# Patient Record
Sex: MALE | Race: WHITE | NOT HISPANIC OR LATINO | Employment: OTHER | URBAN - METROPOLITAN AREA
[De-identification: names, ages, dates, MRNs, and addresses within clinical notes are randomized per-mention and may not be internally consistent; named-entity substitution may affect disease eponyms.]

---

## 2023-02-23 LAB
ALANINE AMINOTRANSFERASE (SGPT) (U/L) IN SER/PLAS: 15 U/L (ref 10–52)
ALBUMIN (G/DL) IN SER/PLAS: 4.3 G/DL (ref 3.4–5)
ALKALINE PHOSPHATASE (U/L) IN SER/PLAS: 53 U/L (ref 33–136)
ANION GAP IN SER/PLAS: 10 MMOL/L (ref 10–20)
ASPARTATE AMINOTRANSFERASE (SGOT) (U/L) IN SER/PLAS: 16 U/L (ref 9–39)
BASOPHILS (10*3/UL) IN BLOOD BY AUTOMATED COUNT: 0.02 X10E9/L (ref 0–0.1)
BASOPHILS/100 LEUKOCYTES IN BLOOD BY AUTOMATED COUNT: 0.3 % (ref 0–2)
BILIRUBIN TOTAL (MG/DL) IN SER/PLAS: 0.8 MG/DL (ref 0–1.2)
CALCIUM (MG/DL) IN SER/PLAS: 8.9 MG/DL (ref 8.6–10.3)
CARBON DIOXIDE, TOTAL (MMOL/L) IN SER/PLAS: 31 MMOL/L (ref 21–32)
CHLORIDE (MMOL/L) IN SER/PLAS: 103 MMOL/L (ref 98–107)
CREATININE (MG/DL) IN SER/PLAS: 0.93 MG/DL (ref 0.5–1.3)
EOSINOPHILS (10*3/UL) IN BLOOD BY AUTOMATED COUNT: 0.16 X10E9/L (ref 0–0.4)
EOSINOPHILS/100 LEUKOCYTES IN BLOOD BY AUTOMATED COUNT: 2.7 % (ref 0–6)
ERYTHROCYTE DISTRIBUTION WIDTH (RATIO) BY AUTOMATED COUNT: 11.2 % (ref 11.5–14.5)
ERYTHROCYTE MEAN CORPUSCULAR HEMOGLOBIN CONCENTRATION (G/DL) BY AUTOMATED: 33.9 G/DL (ref 32–36)
ERYTHROCYTE MEAN CORPUSCULAR VOLUME (FL) BY AUTOMATED COUNT: 98 FL (ref 80–100)
ERYTHROCYTES (10*6/UL) IN BLOOD BY AUTOMATED COUNT: 4.05 X10E12/L (ref 4.5–5.9)
GFR MALE: 85 ML/MIN/1.73M2
GLUCOSE (MG/DL) IN SER/PLAS: 86 MG/DL (ref 74–99)
HEMATOCRIT (%) IN BLOOD BY AUTOMATED COUNT: 39.5 % (ref 41–52)
HEMOGLOBIN (G/DL) IN BLOOD: 13.4 G/DL (ref 13.5–17.5)
IMMATURE GRANULOCYTES/100 LEUKOCYTES IN BLOOD BY AUTOMATED COUNT: 0.2 % (ref 0–0.9)
LEUKOCYTES (10*3/UL) IN BLOOD BY AUTOMATED COUNT: 5.9 X10E9/L (ref 4.4–11.3)
LYMPHOCYTES (10*3/UL) IN BLOOD BY AUTOMATED COUNT: 1.88 X10E9/L (ref 0.8–3)
LYMPHOCYTES/100 LEUKOCYTES IN BLOOD BY AUTOMATED COUNT: 32 % (ref 13–44)
MONOCYTES (10*3/UL) IN BLOOD BY AUTOMATED COUNT: 0.58 X10E9/L (ref 0.05–0.8)
MONOCYTES/100 LEUKOCYTES IN BLOOD BY AUTOMATED COUNT: 9.9 % (ref 2–10)
NEUTROPHILS (10*3/UL) IN BLOOD BY AUTOMATED COUNT: 3.23 X10E9/L (ref 1.6–5.5)
NEUTROPHILS/100 LEUKOCYTES IN BLOOD BY AUTOMATED COUNT: 54.9 % (ref 40–80)
PLATELETS (10*3/UL) IN BLOOD AUTOMATED COUNT: 167 X10E9/L (ref 150–450)
POTASSIUM (MMOL/L) IN SER/PLAS: 4.4 MMOL/L (ref 3.5–5.3)
PROTEIN TOTAL: 6.2 G/DL (ref 6.4–8.2)
SODIUM (MMOL/L) IN SER/PLAS: 140 MMOL/L (ref 136–145)
UREA NITROGEN (MG/DL) IN SER/PLAS: 17 MG/DL (ref 6–23)

## 2023-02-25 LAB — STAPH/MRSA SCREEN, CULTURE: NORMAL

## 2023-06-26 ENCOUNTER — OFFICE VISIT (OUTPATIENT)
Dept: PRIMARY CARE | Facility: CLINIC | Age: 78
End: 2023-06-26
Payer: MEDICARE

## 2023-06-26 VITALS
HEART RATE: 75 BPM | HEIGHT: 75 IN | BODY MASS INDEX: 21.88 KG/M2 | DIASTOLIC BLOOD PRESSURE: 70 MMHG | RESPIRATION RATE: 16 BRPM | SYSTOLIC BLOOD PRESSURE: 122 MMHG | WEIGHT: 176 LBS | OXYGEN SATURATION: 97 %

## 2023-06-26 DIAGNOSIS — F51.01 PRIMARY INSOMNIA: ICD-10-CM

## 2023-06-26 DIAGNOSIS — F32.9 MAJOR DEPRESSIVE DISORDER WITH SINGLE EPISODE, REMISSION STATUS UNSPECIFIED: Primary | ICD-10-CM

## 2023-06-26 DIAGNOSIS — I10 PRIMARY HYPERTENSION: ICD-10-CM

## 2023-06-26 PROBLEM — E78.5 DYSLIPIDEMIA: Status: ACTIVE | Noted: 2023-06-26

## 2023-06-26 PROBLEM — F32.A DEPRESSION: Status: ACTIVE | Noted: 2023-06-26

## 2023-06-26 PROBLEM — G47.00 INSOMNIA: Status: ACTIVE | Noted: 2023-06-26

## 2023-06-26 PROBLEM — I25.10 CORONARY ARTERY DISEASE INVOLVING NATIVE CORONARY ARTERY OF NATIVE HEART WITHOUT ANGINA PECTORIS: Status: ACTIVE | Noted: 2023-06-26

## 2023-06-26 PROBLEM — H90.3 ASYMMETRIC SNHL (SENSORINEURAL HEARING LOSS): Status: ACTIVE | Noted: 2023-06-26

## 2023-06-26 PROBLEM — J45.20 MILD INTERMITTENT ASTHMA WITHOUT COMPLICATION (HHS-HCC): Status: ACTIVE | Noted: 2023-06-26

## 2023-06-26 PROBLEM — I25.2 H/O NON-ST ELEVATION MYOCARDIAL INFARCTION (NSTEMI): Status: ACTIVE | Noted: 2023-06-26

## 2023-06-26 PROCEDURE — 99213 OFFICE O/P EST LOW 20 MIN: CPT | Performed by: FAMILY MEDICINE

## 2023-06-26 PROCEDURE — 3078F DIAST BP <80 MM HG: CPT | Performed by: FAMILY MEDICINE

## 2023-06-26 PROCEDURE — 1160F RVW MEDS BY RX/DR IN RCRD: CPT | Performed by: FAMILY MEDICINE

## 2023-06-26 PROCEDURE — 1036F TOBACCO NON-USER: CPT | Performed by: FAMILY MEDICINE

## 2023-06-26 PROCEDURE — 3074F SYST BP LT 130 MM HG: CPT | Performed by: FAMILY MEDICINE

## 2023-06-26 PROCEDURE — 1159F MED LIST DOCD IN RCRD: CPT | Performed by: FAMILY MEDICINE

## 2023-06-26 RX ORDER — CLOPIDOGREL BISULFATE 75 MG/1
75 TABLET ORAL DAILY
COMMUNITY
End: 2023-10-18

## 2023-06-26 RX ORDER — NITROGLYCERIN 0.4 MG/1
0.4 TABLET SUBLINGUAL EVERY 5 MIN PRN
COMMUNITY

## 2023-06-26 RX ORDER — TRAZODONE HYDROCHLORIDE 50 MG/1
50 TABLET ORAL NIGHTLY
Qty: 90 TABLET | Refills: 1 | Status: SHIPPED | OUTPATIENT
Start: 2023-06-26 | End: 2023-12-30

## 2023-06-26 RX ORDER — ESCITALOPRAM OXALATE 20 MG/1
20 TABLET ORAL DAILY
Qty: 90 TABLET | Refills: 1 | Status: SHIPPED | OUTPATIENT
Start: 2023-06-26 | End: 2024-01-24

## 2023-06-26 RX ORDER — ESCITALOPRAM OXALATE 20 MG/1
20 TABLET ORAL DAILY
COMMUNITY
End: 2023-06-26 | Stop reason: SDUPTHER

## 2023-06-26 RX ORDER — LISINOPRIL 5 MG/1
5 TABLET ORAL DAILY
COMMUNITY
End: 2023-10-18

## 2023-06-26 RX ORDER — METOPROLOL SUCCINATE 50 MG/1
50 TABLET, EXTENDED RELEASE ORAL DAILY
COMMUNITY
Start: 2023-06-21 | End: 2023-10-18

## 2023-06-26 RX ORDER — TRAZODONE HYDROCHLORIDE 50 MG/1
50 TABLET ORAL NIGHTLY
COMMUNITY
End: 2023-06-26 | Stop reason: SDUPTHER

## 2023-06-26 RX ORDER — ROSUVASTATIN CALCIUM 40 MG/1
40 TABLET, COATED ORAL DAILY
COMMUNITY
End: 2023-10-18

## 2023-06-26 ASSESSMENT — ENCOUNTER SYMPTOMS
HEADACHES: 0
PALPITATIONS: 0
SHORTNESS OF BREATH: 0
DEPRESSION: 1
HYPERTENSION: 1

## 2023-06-26 ASSESSMENT — PAIN SCALES - GENERAL: PAINLEVEL: 0-NO PAIN

## 2023-06-26 ASSESSMENT — PATIENT HEALTH QUESTIONNAIRE - PHQ9
1. LITTLE INTEREST OR PLEASURE IN DOING THINGS: NOT AT ALL
2. FEELING DOWN, DEPRESSED OR HOPELESS: NOT AT ALL
SUM OF ALL RESPONSES TO PHQ9 QUESTIONS 1 AND 2: 0

## 2023-06-26 NOTE — PATIENT INSTRUCTIONS
For BP and heart, taking  clopidogrel 75 mg, lisinopril 5 mg,  metoprolol ER 50 mg,  and  nitroglycerin  as needed.  Diet should be  low cholesterol,  high fiber, and low in sodium (2000 mg/day or less)  BP controlled.  Seeing cardiology every 6  months.    Taking rosuvastatin 40 mg for cholesterol.  Check fasting cholesterol next visit.    Depression and sleep are stable on escitalopram 20 mg and trazodone  50 mg.  Follow up  6 months, can make it a Medicare Wellness visit (due Jan 13)

## 2023-06-26 NOTE — PROGRESS NOTES
"Subjective   Patient ID: Nico Spain is a 78 y.o. male who presents for Hypertension and Depression.  When a kid had a kidney infection.   Concerned about whether he has CKD.  Labs  done show normal kidney function     Feeling well.  Not checking BP  No swelling.  Seeing cardiologist for CAD,  wonders about a heart healthy diet.    Depression and sleep doing well with current medication.  No side effects.  No SI      Hypertension  This is a chronic problem. The problem is controlled. Pertinent negatives include no chest pain, headaches, palpitations, peripheral edema or shortness of breath.   DepressionPatient is not experiencing: palpitations and shortness of breath.          Review of Systems   Respiratory:  Negative for shortness of breath.    Cardiovascular:  Negative for chest pain and palpitations.   Neurological:  Negative for headaches.   Psychiatric/Behavioral:  Positive for depression.        Objective   /70 (BP Location: Left arm, Patient Position: Sitting, BP Cuff Size: Adult)   Pulse 75   Resp 16   Ht 1.905 m (6' 3\")   Wt 79.8 kg (176 lb)   SpO2 97%   BMI 22.00 kg/m²     Physical Exam  Vitals reviewed.   Constitutional:       Appearance: Normal appearance.   Cardiovascular:      Rate and Rhythm: Normal rate and regular rhythm.      Heart sounds: No murmur heard.  Pulmonary:      Effort: Pulmonary effort is normal.      Breath sounds: Normal breath sounds.   Musculoskeletal:      Right lower leg: No edema.      Left lower leg: No edema.   Neurological:      Mental Status: He is alert.           Assessment/Plan   Problem List Items Addressed This Visit       Primary hypertension    Depression - Primary    Relevant Medications    escitalopram (Lexapro) 20 mg tablet    Insomnia    Relevant Medications    traZODone (Desyrel) 50 mg tablet          "

## 2023-06-28 LAB
CHOLESTEROL (MG/DL) IN SER/PLAS: 172 MG/DL (ref 0–199)
CHOLESTEROL IN HDL (MG/DL) IN SER/PLAS: 54.4 MG/DL
CHOLESTEROL/HDL RATIO: 3.2
LDL: 99 MG/DL (ref 0–99)
TRIGLYCERIDE (MG/DL) IN SER/PLAS: 93 MG/DL (ref 0–149)
VLDL: 19 MG/DL (ref 0–40)

## 2023-07-01 LAB — LIPOPROTEIN A: 92 MG/DL

## 2023-07-27 LAB
CHOLESTEROL (MG/DL) IN SER/PLAS: 143 MG/DL (ref 0–199)
CHOLESTEROL IN HDL (MG/DL) IN SER/PLAS: 57 MG/DL
CHOLESTEROL/HDL RATIO: 2.5
LDL: 73 MG/DL (ref 0–99)
TRIGLYCERIDE (MG/DL) IN SER/PLAS: 64 MG/DL (ref 0–149)
VLDL: 13 MG/DL (ref 0–40)

## 2023-07-31 LAB — LIPOPROTEIN A: 77 MG/DL

## 2023-10-18 ENCOUNTER — OFFICE VISIT (OUTPATIENT)
Dept: PRIMARY CARE | Facility: CLINIC | Age: 78
End: 2023-10-18
Payer: MEDICARE

## 2023-10-18 VITALS
WEIGHT: 174.6 LBS | OXYGEN SATURATION: 96 % | TEMPERATURE: 97.8 F | DIASTOLIC BLOOD PRESSURE: 64 MMHG | BODY MASS INDEX: 21.71 KG/M2 | SYSTOLIC BLOOD PRESSURE: 140 MMHG | HEART RATE: 74 BPM | HEIGHT: 75 IN

## 2023-10-18 DIAGNOSIS — J06.9 VIRAL URI WITH COUGH: Primary | ICD-10-CM

## 2023-10-18 PROCEDURE — 1036F TOBACCO NON-USER: CPT | Performed by: FAMILY MEDICINE

## 2023-10-18 PROCEDURE — 3077F SYST BP >= 140 MM HG: CPT | Performed by: FAMILY MEDICINE

## 2023-10-18 PROCEDURE — 1126F AMNT PAIN NOTED NONE PRSNT: CPT | Performed by: FAMILY MEDICINE

## 2023-10-18 PROCEDURE — 3078F DIAST BP <80 MM HG: CPT | Performed by: FAMILY MEDICINE

## 2023-10-18 PROCEDURE — 1160F RVW MEDS BY RX/DR IN RCRD: CPT | Performed by: FAMILY MEDICINE

## 2023-10-18 PROCEDURE — 99213 OFFICE O/P EST LOW 20 MIN: CPT | Performed by: FAMILY MEDICINE

## 2023-10-18 PROCEDURE — 1159F MED LIST DOCD IN RCRD: CPT | Performed by: FAMILY MEDICINE

## 2023-10-18 ASSESSMENT — PAIN SCALES - GENERAL: PAINLEVEL: 0-NO PAIN

## 2023-10-18 ASSESSMENT — ENCOUNTER SYMPTOMS
RHINORRHEA: 1
CHILLS: 0
SWEATS: 0
MYALGIAS: 0
WEIGHT LOSS: 0
FEVER: 0
SORE THROAT: 1
HEMOPTYSIS: 0
HEARTBURN: 0
SHORTNESS OF BREATH: 0
HEADACHES: 0
WHEEZING: 1
COUGH: 1

## 2023-10-18 NOTE — ASSESSMENT & PLAN NOTE
Recommend symptomatic treatment including increased hydration, regular nasal saline, use of analgesics such as acetaminophen/ibuprofen   Follow up if symptoms fail to improve

## 2023-10-18 NOTE — PROGRESS NOTES
"Subjective   Patient ID: Nico Spain is a 78 y.o. male who presents for Cough (Wet cough 3 days/) and Nasal Congestion.    Cough  This is a new problem. The current episode started yesterday. The problem has been unchanged. The problem occurs every few hours. The cough is Productive of sputum and productive of purulent sputum. Associated symptoms include ear congestion, nasal congestion, postnasal drip, rhinorrhea, a sore throat and wheezing. Pertinent negatives include no chest pain, chills, ear pain, fever, headaches, heartburn, hemoptysis, myalgias, rash, shortness of breath, sweats or weight loss. Risk factors for lung disease include animal exposure and smoking/tobacco exposure.        Review of Systems   Constitutional:  Negative for chills, fever and weight loss.   HENT:  Positive for postnasal drip, rhinorrhea and sore throat. Negative for ear pain.    Respiratory:  Positive for cough and wheezing. Negative for hemoptysis and shortness of breath.    Cardiovascular:  Negative for chest pain.   Gastrointestinal:  Negative for heartburn.   Musculoskeletal:  Negative for myalgias.   Skin:  Negative for rash.   Neurological:  Negative for headaches.       Objective   /64 (BP Location: Right arm, Patient Position: Sitting, BP Cuff Size: Small adult)   Pulse 74   Temp 36.6 °C (97.8 °F) (Oral)   Ht 1.905 m (6' 3\")   Wt 79.2 kg (174 lb 9.6 oz)   SpO2 96%   BMI 21.82 kg/m²     Physical Exam  HENT:      Head: Normocephalic and atraumatic.      Right Ear: Tympanic membrane and ear canal normal.      Left Ear: Tympanic membrane and ear canal normal.      Nose: Nose normal.      Mouth/Throat:      Mouth: Mucous membranes are moist.      Pharynx: Oropharynx is clear.   Eyes:      Conjunctiva/sclera: Conjunctivae normal.   Cardiovascular:      Rate and Rhythm: Normal rate and regular rhythm.   Pulmonary:      Effort: Pulmonary effort is normal.      Breath sounds: Normal breath sounds.   Skin:     General: Skin " is warm.   Neurological:      Mental Status: He is alert.         Assessment/Plan   Problem List Items Addressed This Visit             ICD-10-CM    Viral URI with cough - Primary J06.9     Recommend symptomatic treatment including increased hydration, regular nasal saline, use of analgesics such as acetaminophen/ibuprofen   Follow up if symptoms fail to improve

## 2023-10-19 PROBLEM — I20.0 UNSTABLE ANGINA PECTORIS (MULTI): Status: ACTIVE | Noted: 2023-10-19

## 2023-10-19 PROBLEM — L29.9 PRURITUS: Status: ACTIVE | Noted: 2023-10-19

## 2023-10-19 PROBLEM — M25.472 LEFT ANKLE SWELLING: Status: ACTIVE | Noted: 2023-10-19

## 2023-10-19 PROBLEM — H35.371 EPIRETINAL MEMBRANE (ERM) OF RIGHT EYE: Status: ACTIVE | Noted: 2018-05-07

## 2023-10-19 PROBLEM — K40.90 INGUINAL HERNIA, LEFT: Status: ACTIVE | Noted: 2023-10-19

## 2023-10-19 PROBLEM — S80.02XA CONTUSION OF LEFT KNEE: Status: ACTIVE | Noted: 2023-10-19

## 2023-10-19 PROBLEM — G89.29 CHRONIC BILATERAL LOW BACK PAIN WITHOUT SCIATICA: Status: ACTIVE | Noted: 2023-10-19

## 2023-10-19 PROBLEM — M54.50 CHRONIC BILATERAL LOW BACK PAIN WITHOUT SCIATICA: Status: ACTIVE | Noted: 2023-10-19

## 2023-10-19 PROBLEM — I21.9 MYOCARDIAL INFARCTION (MULTI): Status: ACTIVE | Noted: 2023-10-19

## 2023-10-19 PROBLEM — S70.01XA CONTUSION OF RIGHT HIP: Status: ACTIVE | Noted: 2023-10-19

## 2023-10-19 PROBLEM — Z96.1 PSEUDOPHAKIA OF BOTH EYES: Status: ACTIVE | Noted: 2018-07-20

## 2023-10-20 ENCOUNTER — CLINICAL SUPPORT (OUTPATIENT)
Dept: AUDIOLOGY | Facility: CLINIC | Age: 78
End: 2023-10-20
Payer: MEDICARE

## 2023-10-20 DIAGNOSIS — H90.3 ASYMMETRIC SNHL (SENSORINEURAL HEARING LOSS): Primary | ICD-10-CM

## 2023-10-20 PROCEDURE — 92557 COMPREHENSIVE HEARING TEST: CPT | Performed by: AUDIOLOGIST

## 2023-10-20 PROCEDURE — 92567 TYMPANOMETRY: CPT | Performed by: AUDIOLOGIST

## 2023-10-20 NOTE — PROGRESS NOTES
"AUDIOLOGY ADULT AUDIOMETRIC EVALUATION      Name:  Nico Spain  :  1945  Age:  78 y.o.  Date of Evaluation:  10/20/2023     HISTORY  Nico Spain was seen today for a hearing evaluation due to known asymmetric sensorineural hearing loss, worse in the left ear.   He reports sudden hearing loss in the left ear which occurred in . He reports more frustration hearing in noisy environments and feeling removed from many conversations due to his hearing loss.    Denies hearing loss, tinnitus, vertigo, aural pain, drainage, fullness (some fullness after recent flight and cold), history of familial hearing loss or noise exposure.    EVALUATION  See scanned Audiogram in \"Media\".    RESULTS:    Otoscopic Evaluation:    Otoscopy revealed clear ear canals and visualized tympanic membrane, bilaterally.    Immittance:    Tympanometry:  RIGHT: Negative middle ear pressure, normal mobility, and ear canal volume.  LEFT: Negative middle ear pressure, normal mobility, and ear canal volume.    Stapedial Acoustic Reflexes Thresholds (ART)(Probe ear)  Could not be tested due to lack of probe tip seal.    Pure Tone and Speech Audiometry:    Test technique:  Pure Tone Audiometry via insert earphones  Test Reliability:   good    RIGHT: Normal hearing 125-3000 Hz sloping to a moderately severe sensorineural hearing loss at 8000 Hz. Word Recognition score was excellent using recorded material (NU-6 10-word list ordered by difficulty).   LEFT: Mild to profound sensorineural hearing loss 125-8000 Hz. Word Recognition score was poor using recorded material (NU-6 25-word list ordered by difficulty).     IMPRESSIONS:  Today's test results indicate stable hearing as compared to previous testing.  Asymmetric sensorineural hearing loss worse in the left ear. Discussed audiogram at length including speech sounds in which access is limited due to the hearing loss. Discussed implications of hearing loss as well as benefits of amplification. " Communication strategies were discussed (utilizing visual cues/gestures; reducing background noise and distance from desired source; increasing light to assist with visual cues; use of clear speech).     Consider BICROS amplification, briefly discussed BAImplantation which is he not interested at this time.    RECOMMENDATIONS:  Continue medical follow-up with physician.  Return for audiologic assessment in conjunction with otologic care or annually.   Amplification options briefly discussed and literature provided re: hearing aids. Consider amplification pending medical clearance. Check insurance benefit for hearing aid benefits and in-network providers. To schedule an appointment for a hearing aid consultation call [phone].     PATIENT EDUCATION:   Discussed results and recommendations with Nico Spain.  Questions were addressed and the patient was encouraged to contact our department ([phone]) should concerns arise.      ALMA Duran, CCC-A  Senior Clinical Audiologist    TIME: [id number]

## 2023-11-22 ENCOUNTER — OFFICE VISIT (OUTPATIENT)
Dept: PRIMARY CARE | Facility: CLINIC | Age: 78
End: 2023-11-22
Payer: MEDICARE

## 2023-11-22 VITALS
BODY MASS INDEX: 22.26 KG/M2 | TEMPERATURE: 97.8 F | DIASTOLIC BLOOD PRESSURE: 78 MMHG | OXYGEN SATURATION: 98 % | WEIGHT: 179 LBS | HEIGHT: 75 IN | HEART RATE: 78 BPM | SYSTOLIC BLOOD PRESSURE: 124 MMHG

## 2023-11-22 DIAGNOSIS — N52.9 ERECTILE DYSFUNCTION, UNSPECIFIED ERECTILE DYSFUNCTION TYPE: Primary | ICD-10-CM

## 2023-11-22 DIAGNOSIS — C95.91 LEUKEMIA IN REMISSION, UNSPECIFIED LEUKEMIA TYPE (MULTI): ICD-10-CM

## 2023-11-22 DIAGNOSIS — R19.6 HALITOSIS: ICD-10-CM

## 2023-11-22 DIAGNOSIS — H91.92 HEARING LOSS OF LEFT EAR, UNSPECIFIED HEARING LOSS TYPE: ICD-10-CM

## 2023-11-22 PROCEDURE — 1126F AMNT PAIN NOTED NONE PRSNT: CPT | Performed by: FAMILY MEDICINE

## 2023-11-22 PROCEDURE — 3074F SYST BP LT 130 MM HG: CPT | Performed by: FAMILY MEDICINE

## 2023-11-22 PROCEDURE — 99213 OFFICE O/P EST LOW 20 MIN: CPT | Performed by: FAMILY MEDICINE

## 2023-11-22 PROCEDURE — 1159F MED LIST DOCD IN RCRD: CPT | Performed by: FAMILY MEDICINE

## 2023-11-22 PROCEDURE — 1160F RVW MEDS BY RX/DR IN RCRD: CPT | Performed by: FAMILY MEDICINE

## 2023-11-22 PROCEDURE — 1036F TOBACCO NON-USER: CPT | Performed by: FAMILY MEDICINE

## 2023-11-22 PROCEDURE — 3078F DIAST BP <80 MM HG: CPT | Performed by: FAMILY MEDICINE

## 2023-11-22 RX ORDER — TADALAFIL 20 MG/1
20 TABLET ORAL DAILY PRN
Qty: 12 TABLET | Refills: 3 | Status: SHIPPED | OUTPATIENT
Start: 2023-11-22 | End: 2024-11-21

## 2023-11-22 ASSESSMENT — PAIN SCALES - GENERAL: PAINLEVEL: 0-NO PAIN

## 2023-11-22 ASSESSMENT — PATIENT HEALTH QUESTIONNAIRE - PHQ9
2. FEELING DOWN, DEPRESSED OR HOPELESS: NOT AT ALL
SUM OF ALL RESPONSES TO PHQ9 QUESTIONS 1 AND 2: 0
1. LITTLE INTEREST OR PLEASURE IN DOING THINGS: NOT AT ALL

## 2023-11-22 ASSESSMENT — ENCOUNTER SYMPTOMS
LOSS OF SENSATION IN FEET: 0
DEPRESSION: 0
OCCASIONAL FEELINGS OF UNSTEADINESS: 0

## 2023-11-22 NOTE — PATIENT INSTRUCTIONS
Left ear hearing loss since 1997.  Will be getting hearing aid.  No contraindications.    For erectile dysfunction, cialis sent to the pharmacy to use as needed.  Do not use if nitroglycerin is taken, since BP can drop too much.    For halitosis, can try oscar pot to help clear sinuses, and see if that works.

## 2023-11-22 NOTE — PROGRESS NOTES
"Subjective   Patient ID: Nico Spain is a 78 y.o. male who presents for Hearing Problem.  Left ear with hearing loss.  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  Not clear whether it was related or not.  Now decided he'd like to do something about it.  Has been to hearing center at [facility].  Had hearing tests, and advised hearing aid.  They said he may want to let PCP know, and make sure there were no problems with getting one.    Has had viagra and cialis in the past for ED  Has had issues for about 5 years, came  on gradually.  No urinary issues.  Would prefer cialis.    Also complains of halitosis, for long time.  Only bothers him, because it bothers other people.  Had recent dental exam, and no issues.  No GERD, but does have chronic  sinus issues.    Hearing Problem        Review of Systems    Objective   /78 (BP Location: Right arm, Patient Position: Sitting, BP Cuff Size: Large adult)   Pulse 78   Temp 36.6 °C (97.8 °F) (Oral)   Ht 1.905 m (6' 3\")   Wt 81.2 kg (179 lb)   SpO2 98%   BMI 22.37 kg/m²     Physical Exam  Vitals reviewed.   Constitutional:       Appearance: Normal appearance.   HENT:      Right Ear: Tympanic membrane and ear canal normal.      Left Ear: Tympanic membrane and ear canal normal.   Cardiovascular:      Rate and Rhythm: Normal rate and regular rhythm.      Heart sounds: No murmur heard.  Pulmonary:      Effort: Pulmonary effort is normal.      Breath sounds: Normal breath sounds.   Musculoskeletal:      Right lower leg: No edema.      Left lower leg: No edema.   Lymphadenopathy:      Cervical: No cervical adenopathy.   Neurological:      Mental Status: He is alert.           Assessment/Plan   Problem List Items Addressed This Visit       Leukemia in remission, unspecified leukemia type (CMS/[facility])     Other Visit Diagnoses       Erectile dysfunction, unspecified erectile dysfunction type    -  Primary    Relevant Medications    tadalafil (Cialis) 20 mg tablet "    Hearing loss of left ear, unspecified hearing loss type        Halitosis

## 2023-12-20 ENCOUNTER — APPOINTMENT (OUTPATIENT)
Dept: HEMATOLOGY/ONCOLOGY | Facility: HOSPITAL | Age: 78
End: 2023-12-20
Payer: MEDICARE

## 2023-12-30 DIAGNOSIS — F51.01 PRIMARY INSOMNIA: ICD-10-CM

## 2023-12-30 RX ORDER — TRAZODONE HYDROCHLORIDE 50 MG/1
50 TABLET ORAL NIGHTLY
Qty: 90 TABLET | Refills: 1 | Status: SHIPPED | OUTPATIENT
Start: 2023-12-30

## 2024-01-24 DIAGNOSIS — F32.9 MAJOR DEPRESSIVE DISORDER WITH SINGLE EPISODE, REMISSION STATUS UNSPECIFIED: ICD-10-CM

## 2024-01-24 RX ORDER — ESCITALOPRAM OXALATE 20 MG/1
20 TABLET ORAL DAILY
Qty: 90 TABLET | Refills: 1 | Status: SHIPPED | OUTPATIENT
Start: 2024-01-24

## 2024-02-07 ENCOUNTER — OFFICE VISIT (OUTPATIENT)
Dept: HEMATOLOGY/ONCOLOGY | Facility: HOSPITAL | Age: 79
End: 2024-02-07
Payer: MEDICARE

## 2024-02-07 ENCOUNTER — LAB (OUTPATIENT)
Dept: LAB | Facility: HOSPITAL | Age: 79
End: 2024-02-07
Payer: MEDICARE

## 2024-02-07 VITALS
SYSTOLIC BLOOD PRESSURE: 148 MMHG | HEART RATE: 62 BPM | TEMPERATURE: 98.1 F | OXYGEN SATURATION: 97 % | DIASTOLIC BLOOD PRESSURE: 75 MMHG | RESPIRATION RATE: 17 BRPM

## 2024-02-07 DIAGNOSIS — C95.91 LEUKEMIA IN REMISSION, UNSPECIFIED LEUKEMIA TYPE (MULTI): Primary | ICD-10-CM

## 2024-02-07 DIAGNOSIS — C95.91 LEUKEMIA IN REMISSION, UNSPECIFIED LEUKEMIA TYPE (MULTI): ICD-10-CM

## 2024-02-07 LAB
ALBUMIN SERPL BCP-MCNC: 4.4 G/DL (ref 3.4–5)
ALP SERPL-CCNC: 64 U/L (ref 33–136)
ALT SERPL W P-5'-P-CCNC: 18 U/L (ref 10–52)
ANION GAP SERPL CALC-SCNC: 11 MMOL/L (ref 10–20)
AST SERPL W P-5'-P-CCNC: 21 U/L (ref 9–39)
BASOPHILS # BLD AUTO: 0.04 X10*3/UL (ref 0–0.1)
BASOPHILS NFR BLD AUTO: 0.5 %
BILIRUB DIRECT SERPL-MCNC: 0.1 MG/DL (ref 0–0.3)
BILIRUB SERPL-MCNC: 0.8 MG/DL (ref 0–1.2)
BUN SERPL-MCNC: 19 MG/DL (ref 6–23)
CALCIUM SERPL-MCNC: 9.4 MG/DL (ref 8.6–10.3)
CHLORIDE SERPL-SCNC: 103 MMOL/L (ref 98–107)
CO2 SERPL-SCNC: 30 MMOL/L (ref 21–32)
CREAT SERPL-MCNC: 0.95 MG/DL (ref 0.5–1.3)
EGFRCR SERPLBLD CKD-EPI 2021: 82 ML/MIN/1.73M*2
EOSINOPHIL # BLD AUTO: 0.25 X10*3/UL (ref 0–0.4)
EOSINOPHIL NFR BLD AUTO: 2.9 %
ERYTHROCYTE [DISTWIDTH] IN BLOOD BY AUTOMATED COUNT: 11.4 % (ref 11.5–14.5)
GLUCOSE SERPL-MCNC: 92 MG/DL (ref 74–99)
HCT VFR BLD AUTO: 43.8 % (ref 41–52)
HGB BLD-MCNC: 15.1 G/DL (ref 13.5–17.5)
IMM GRANULOCYTES # BLD AUTO: 0.01 X10*3/UL (ref 0–0.5)
IMM GRANULOCYTES NFR BLD AUTO: 0.1 % (ref 0–0.9)
LDH SERPL L TO P-CCNC: 169 U/L (ref 84–246)
LYMPHOCYTES # BLD AUTO: 2.1 X10*3/UL (ref 0.8–3)
LYMPHOCYTES NFR BLD AUTO: 24.7 %
MCH RBC QN AUTO: 32.4 PG (ref 26–34)
MCHC RBC AUTO-ENTMCNC: 34.5 G/DL (ref 32–36)
MCV RBC AUTO: 94 FL (ref 80–100)
MONOCYTES # BLD AUTO: 0.67 X10*3/UL (ref 0.05–0.8)
MONOCYTES NFR BLD AUTO: 7.9 %
NEUTROPHILS # BLD AUTO: 5.42 X10*3/UL (ref 1.6–5.5)
NEUTROPHILS NFR BLD AUTO: 63.9 %
NRBC BLD-RTO: 0 /100 WBCS (ref 0–0)
PHOSPHATE SERPL-MCNC: 3.5 MG/DL (ref 2.5–4.9)
PLATELET # BLD AUTO: 174 X10*3/UL (ref 150–450)
POTASSIUM SERPL-SCNC: 3.9 MMOL/L (ref 3.5–5.3)
PROT SERPL-MCNC: 7 G/DL (ref 6.4–8.2)
PROT SERPL-MCNC: 7.2 G/DL (ref 6.4–8.2)
RBC # BLD AUTO: 4.66 X10*6/UL (ref 4.5–5.9)
SODIUM SERPL-SCNC: 140 MMOL/L (ref 136–145)
WBC # BLD AUTO: 8.5 X10*3/UL (ref 4.4–11.3)

## 2024-02-07 PROCEDURE — 3077F SYST BP >= 140 MM HG: CPT | Performed by: INTERNAL MEDICINE

## 2024-02-07 PROCEDURE — 3078F DIAST BP <80 MM HG: CPT | Performed by: INTERNAL MEDICINE

## 2024-02-07 PROCEDURE — 84155 ASSAY OF PROTEIN SERUM: CPT | Mod: 59 | Performed by: INTERNAL MEDICINE

## 2024-02-07 PROCEDURE — 99213 OFFICE O/P EST LOW 20 MIN: CPT | Performed by: INTERNAL MEDICINE

## 2024-02-07 PROCEDURE — 1126F AMNT PAIN NOTED NONE PRSNT: CPT | Performed by: INTERNAL MEDICINE

## 2024-02-07 PROCEDURE — 1036F TOBACCO NON-USER: CPT | Performed by: INTERNAL MEDICINE

## 2024-02-07 PROCEDURE — 1159F MED LIST DOCD IN RCRD: CPT | Performed by: INTERNAL MEDICINE

## 2024-02-07 PROCEDURE — 83615 LACTATE (LD) (LDH) ENZYME: CPT

## 2024-02-07 PROCEDURE — 36415 COLL VENOUS BLD VENIPUNCTURE: CPT

## 2024-02-07 PROCEDURE — 84100 ASSAY OF PHOSPHORUS: CPT | Performed by: INTERNAL MEDICINE

## 2024-02-07 PROCEDURE — 84165 PROTEIN E-PHORESIS SERUM: CPT | Performed by: INTERNAL MEDICINE

## 2024-02-07 PROCEDURE — 84075 ASSAY ALKALINE PHOSPHATASE: CPT

## 2024-02-07 PROCEDURE — 85025 COMPLETE CBC W/AUTO DIFF WBC: CPT

## 2024-02-07 PROCEDURE — 82248 BILIRUBIN DIRECT: CPT | Performed by: INTERNAL MEDICINE

## 2024-02-07 ASSESSMENT — PAIN SCALES - GENERAL: PAINLEVEL: 0-NO PAIN

## 2024-02-07 NOTE — PROGRESS NOTES
Patient ID: Neena Solitario is a 78 y.o. male.  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 indeed be a  higher years to be published this October. From [facility]. No fevers chills abdominal pain weight loss or night sweats otherwise feels quite well        April 152015     no back pain skin lesions no back pain no ID SX     [facility] less notices less portions     weight 79 now 75 KG over 4 y  unclear cause no travel SX     no HCL SX     meds stable     Dec 23 2015  noted elevated BP will recheck  last colonoscopy 2006  needs one and encouraged.  will MEDINA thereafter  may proceed with MB if it is repeatedly low     July 13 2016  colonscopy 1   benign polyp told to return in 10 y  \PSA 1.3     counts good  medically OK otherwise.     glaucoma stable  asked about use of marajuana  cateract     March 29 2017     travel to [place] and plans to go to [place]     feels well in custodial.  purified water   glaucoma drops.     3lb weight gain     Dec 20 2017     CBC date/time       WBC     HGB     HCT     PLT      Neut      20-Dec-2017 14:29   7.3     14.0    41.1    199     4.58  29-Mar-2017 11:52   7.4     14.4    42.4    191     4.83  13-Jul-2016 13:27   7.1     13.6    39.9(L) 186     4.54  23-Dec-2015 13:43   5.5     12.9(L) 38.2(L) 171     3.02  15-Apr-2015 13:34   6.8     13.6    40.2(L) 202     4.31  02-Jul-2014 15:25   5.9     13.2(L) 38.2(L) 164     3.01  18-Dec-2013 13:16   7.6     14.0    40.8(L) 190     5.12  weight stable  no night sweats  no evidence of recurrance  tendonitis of elbow. stopped it     forgetful for names     72 out of sorts  stopped smoking pipe.     March 27 2019     cateract surgery  basal cell exam neg Derm exams 2015 was last surgery.        tendonitis of elbow. PT  restarted lipitor and lexipro  travel to [place] and [facility] no health usues     no nodes no nt sweats  otherwise well.        Nov 6 2019     [place] in May        CBC date/time       WBC     HGB     HCT     PLT     Neut      06-Nov-2019 13:56   6.7     13.7    41.1    190     3.77  27-Mar-2019 12:46   6.9     13.9    41.1    189     3.96  03-Oct-2018 13:47   7.9     14.1    40.7(L) 192     4.83     BMP date/time       NA              K               CL              CO2             BUN             CREAT             06-Nov-2019 13:56   138             4.3             101             N/A             18              1.05  27-Mar-2019 12:47   140             4.3             103             N/A             20              0.89  03-Oct-2018 13:47   141             4.3             102             N/A             18              0.99     sinus issues likely related to smoking  works out   good weight     functional health - dementia= multi causes; suggested diet and exercize     reduce carbs  colonoscopy scheduled 2020 spring.     Sept 23 2020  doing well no recurrance of HCL.     last Dec 26 Marco Antonio admitted with MI had cath and LAD treatment of plaque   placed on med rx        Medical Decision Making: Patient is a 74 year old male  with a past medical history of hairy cell leukemia, HLD, glaucoma, presenting to the ED today for complaints of chest  pain. IV access established in the ED. Patient placed on cardiac monitoring. Patient was administered Aspirin 324 mg. To further assess the patient's condition CBC+ differential, CMP, PT + INR, CRP, Sed rate, delta troponin, EKG, and CXR were ordered.  Troponin elevated at 0.06. CBC+ differential, CMP, PT + INR, CRP, and sed rate are fairly unremarkable. EKG obtained and read at 0854 shows normal sinus rhythm, HR of 76, normal axis, and good R-wave progression. Repeat EKG at 1158 shows normal sinus  rhythm, HR of 62, otherwise normal EKG. Given elevated troponin, Dr. Tyra Cortez, Cardiology, was consulted at 1031. She advised administering patient Heparin and to keep patient NPO. Patient was given 4000 unit Heparin per request. Case discussed  with Dr. Becker at 1040, he advised patient be hospitalized under his care for further evaluation of NSTEMI. Patient is agreeable to hospitalization. Impression: 1. Myocardial Infarction   on lipitor ASA metoprolol     CBC date/time       WBC     HGB     HCT     PLT     Neut      23-Sep-2020 13:34   9.4     13.7    40.1(L) 169     6.57(H)  27-Dec-2019 05:18   8.0     14.6    43.6    180     N/A  26-Dec-2019 09:00   5.9     14.2    42.0    190     3.74     BMP date/time       NA              K               CL              CO2             BUN             CREAT             23-Sep-2020 13:34   142             4.4             104             N/A             15              1.38(H)  27-Dec-2019 05:18   138             4.2             105             N/A             17              0.93     could be related to Clonal hematopoiesis   had stent placed - drove to hospital and drove home.     135/66  HR 72  no nt sweats or nodes noted.     May 26 2021     CBC date/time       WBC     HGB     HCT     PLT     Neut      26-May-2021 13:42   7.4     13.9    41.2    184      4.49  23-Sep-2020 13:34   9.4     13.7    40.1(L) 169     6.57(H)  27-Dec-2019 05:18   8.0     14.6    43.6    180     N/A     BMP date/time       NA              K               CL              CO2             BUN             CREAT             26-May-2021 13:43   144             4.6             107             N/A             22              1.02  23-Sep-2020 13:34   142             4.4             104             N/A             15              1.38(H)  27-Dec-2019 05:18   138             4.2             105             N/A             17              0.93     LDH date/time       LDH     27-Dec-2019 05:18   N/A  23-Dec-2015 13:43   N/A  23-Dec-2015 13:43   N/A     resolved anemia        on heart meds for ishemia 2019 2 stents placed for tightness in chest  and possible renal in sufficiency.     right in for analysis.     active   [place] in and [place] in Oct and December.           April 6 22     CBC date/time       WBC     HGB     HCT     PLT     Neut      06-Apr-2022 14:12   6.0     14.0    40.8(L) 145(L)  3.18  26-May-2021 13:42   7.4     13.9    41.2    184     4.49  23-Sep-2020 13:34   9.4     13.7    40.1(L) 169     6.57(H)     BMP date/time       NA              K               CL              CO2             BUN             CREAT             06-Apr-2022 14:12   141             4.2             102             N/A             21              0.97  26-May-2021 13:43   144             4.6             107             N/A             22              1.02  23-Sep-2020 13:34   142             4.4             104             N/A             15              1.38(H)     no travel.  has been well  [place].        ECHO LV dyf, mild A regurg.     weight stable no nt sweats no diarrhea.  not slight low plt of unknown cause will observe n absence of other leads.        June 28  post 2 stents on capo  /70 HR 61  concerned.     CBC OK  hernia in march OK  planning a trip     derm - 2 y ago and is  OK                                   Review of Systems:   ·  System Review All other systems have been reviewed and are negative for complaint.      · Constitutional NEGATIVE: Weight Loss      · Respiratory Comments pipe smoking      · Cardiology Comments no exercise intolerance walking lipitor      ·  Hematologic/Lymph POSITIVE: Anemia     NEGATIVE: Night Sweats     Comments needs colonoscopy no noted blood loss            Allergies and Intolerances:       Allergies:         Vancomycin Hydrochloride: Drug, Rash, Active     Outpatient Medication Profile:  * Patient Currently Takes Medications as of 28-Jun-2023 15:37 documented in Structured Notes         acetaminophen-oxyCODONE 325 mg-5 mg oral  tablet: Last Dose Taken:  , 1 tab(s) orally every 6 hours, As Needed , Start Date: 10-Mar-2023         Peridex 0.12% mucous membrane liquid: Last Dose Taken:  , 15 milliliter(s)  orally once a day night prior to surgery and morning of surgery, Start Date: 23-Feb-2023         nitroglycerin 0.4 mg sublingual tablet: Last Dose Taken:  , 1 tab(s) sublingually  every 5 minutes, As Needed -for angina , Start Date: 27-Dec-2019         metoprolol succinate 50 mg oral tablet, extended release: Last Dose Taken:   , 1 tab(s) orally once a day         Fish Oil 1200 mg oral capsule: Last Dose Taken:  , 1 cap(s) orally once  a day         Vitamin D3 50 mcg (2000 intl units) oral capsule: Last Dose Taken:  ,  1 cap(s) orally once a day         biotin 10 mg oral tablet: Last Dose Taken:  , 1 tab(s) orally once a  day         Zinc 50 mg Pink oral capsule: Last Dose Taken:  , 1 cap(s) orally once  a day         blood pressure health supplement: Last Dose Taken:  , 1 dose(s) orally  once a day         CoQ10 100 mg oral capsule: Last Dose Taken:  , 1 cap(s) orally once a  day         Methyl B-12 5000 mcg oral tablet, chewable: Last Dose Taken:  , 1 tab(s)  orally once a day         Multiple Vitamins oral tablet: Last Dose Taken:  , 1 tab(s)  orally once  a day         lisinopril 5 mg oral tablet: Last Dose Taken:  , 1 tab(s) orally once  a day         rosuvastatin 40 mg oral tablet: Last Dose Taken:  , 1 tab(s) orally once  a day         clopidogrel 75 mg oral tablet: Last Dose Taken:  , 1 tab(s) orally once  a day         traZODone 50 mg oral tablet: Last Dose Taken:  , 1 tab(s) orally once  a day (at bedtime)         escitalopram 20 mg oral tablet: Last Dose Taken:  , 1 tab(s) orally once  a day             Medical History:         Basal cell adenocarcinoma: ICD-10: C44.91, Status: Active,  Description: 6 separate of head and neck         Basal cell adenocarcinoma: ICD-10: C44.91, Status: Active         Basal cell adenocarcinoma: ICD-10: C44.91, Status: Active       Surg History:         Chest pain: ICD-10: R07.9, Status: Active     Family History: No Family History items are recorded  in the problem list.      Social History:   Social Substance History:  ·  Smoking Status current some day smoker   ·  Tobacco Use occasionally   ·  Alcohol Use occasionally   ·  Drug Use denies   ·  Additional History     desires to continue pipe smoking for his sense of withell being           Vitals and Measurements:   Vitals: Temp: 36.7  HR: 61  RR: 16  BP: 129/70  SPO2%:   97   Measurements: HT(cm): 186  WT(kg): 78.5  BSA: 2.01   BMI:  22.6   Last 3 Weights & Heights: Date:                           Weight/Scale Type:                    Height:   23-Sep-2020 15:19                78.9  kg / standing scale                     190.4  cm  27-Dec-2019 04:26                78.6  kg / standing                      26-Dec-2019 09:00                79.8  kg                     190.5  cm      Physical Exam:      Constitutional: Well developed, awake/alert/oriented  x3, no distress, alert and cooperative   Eyes: PERRL, EOMI, clear sclera   ENMT: mucous membranes moist, no apparent injury,  no lesions seen   Head/Neck: Neck supple, no apparent injury, thyroid  without mass or  tenderness, No JVD, trachea midline, no bruits   Respiratory/Thorax: Patent airways, CTAB, normal  breath sounds with good chest expansion, thorax symmetric   Cardiovascular: Regular, rate and rhythm, no murmurs,  2+ equal pulses of the extremities, normal S 1and S 2 note systolic is 148/68   Gastrointestinal: Nondistended, soft, non-tender,  no rebound tenderness or guarding, no masses palpable, no organomegaly, +BS, no bruits   Genitourinary: No Discharge, vesicles or other abnormalities   Musculoskeletal: ROM intact, no swelling, normal  strength   Extremities: normal extremities, no cyanosis edema,  contusions or wounds, no clubbing   Neurological: alert and oriented x3, intact senses,  motor, response and reflexes, normal strength   Breast: No masses, tenderness, no discharge or discoloration   Lymphatic: No significant lymphadenopathy no spleen  no evidence   Psychological: Appropriate mood and behavior   Skin: Warm and dry, no lesions, no rashes         Counseling:  Tobacco Cessation Counseling Given: yes  requested  to decline         Lab Results:     ·  Results        CBC date/time       WBC     HGB     HCT     PLT     Neut      28-Jun-2023 15:11   6.9     14.1    40.0(L) 169     4.11     BMP date/time       NA              K               CL              CO2             BUN             CREAT             28-Jun-2023 15:11   142             4.3             105             N/A             18              0.88     LDH date/time       LDH     27-Dec-2019 05:18   N/A     Assessment and Plan:      Assessment and Plan:   Assessment:     in July elevated CHOL and e   colon polyp removed advised colonoscopy in 5 y 2015/6 redo i 2021   derm griffin for prior squamous cells every 6 m FU none recent negative last y  beign biopsy      in July elevated CHOL and   on lipitor.  may start BP med 150/72 vs 135/70     skin has been fine no biopsy by derm. since 2015  CBC nml  otherwise doing well.  had STEMI in Dec  now fine  "  note slight HCT 40 _with creat 1.4   now resolved. creat 1.02 and HCT 41     April 6  stable slight drops n plt and HCT will observe.     Jun 28 grand daughter in May  BP management  has had stents  no HCL  Plan:    6 m CBC   Chem  observe  colonoscopy 5 y polyp.was 1-2 y ago (2021)                       Note Recipients: Buddy Lazar MD - [id number]  Tyra Castle MD - [id number]   Select \"Yes\" when ready to send to Provider(s) Listed Above:  Note sent to providers named above         Electronic Signatures:  Buddy Lazar)  (Signed 28-Jun-2023 16:02)          Authored: Patient Visit Information, Cancer History,  History of Present Illness, Review of Systems, Allergies and Outpatient Medication Profile, Problem List, Social History, Performance Assessments, Vitals and Measurements, Physical Exam, Health Maintenance, Results, Assessment and Plan, To Send Document  via Auto Fax, Attestation        Last Updated: 28-Jun-2023 16:02 by Buddy Lazar)               Last signed by: Buddy Lazar MD at 6/28/2023  4:02 PM   Electronically signed by Buddy Lazar MD at 6/28/2023  4:02 PM         Legacy Encounter on 6/28/2023          Note shared with patient  Additional Documentation    Vitals: /70     Pulse 61     Temp 36.7 °C (98.1 °F)     Resp 16     Ht 1.86 m (6' 1.23\")     Wt 78.5 kg (173 lb 1 oz)     SpO2 97%     BMI 22.69 kg/m²     BSA 2.01 m²          More Vitals   Flowsheets: Interfaced Flowsheet Data,     Vital Signs,     Vitals Reassessment   Encounter Info: Billing Info,     Histor     Subjective    HPI      Objective    BSA: There is no height or weight on file to calculate BSA.  /75   Pulse 62   Temp 36.7 °C (98.1 °F)   Resp 17   SpO2 97%      Physical Exam  no nodes      Performance Status:  Asymptomatic        Assessment/Plan   Remission Hairy cell  Labs today    Cancer Staging   No matching staging information was found for the patient.    Oncology History    No " history exists.        There are no diagnoses linked to this encounter.         Buddy Lazar MD

## 2024-02-08 LAB
ALBUMIN: 4.5 G/DL (ref 3.4–5)
ALPHA 1 GLOBULIN: 0.3 G/DL (ref 0.2–0.6)
ALPHA 2 GLOBULIN: 0.5 G/DL (ref 0.4–1.1)
BETA GLOBULIN: 0.8 G/DL (ref 0.5–1.2)
GAMMA GLOBULIN: 1.1 G/DL (ref 0.5–1.4)
PATH REVIEW-SERUM PROTEIN ELECTROPHORESIS: NORMAL
PROTEIN ELECTROPHORESIS COMMENT: NORMAL

## 2024-05-16 ENCOUNTER — CLINICAL SUPPORT (OUTPATIENT)
Dept: AUDIOLOGY | Facility: CLINIC | Age: 79
End: 2024-05-16
Payer: MEDICARE

## 2024-05-16 DIAGNOSIS — H90.3 ASYMMETRIC SNHL (SENSORINEURAL HEARING LOSS): Primary | ICD-10-CM

## 2024-05-16 PROCEDURE — 92700 UNLISTED ORL SERVICE/PX: CPT | Performed by: AUDIOLOGIST

## 2024-05-16 NOTE — PROGRESS NOTES
HEARING AID (HA) CONSULTATION       Name:  Nico Spain  :  1945  Age:  78 y.o.  Date of Evaluation:  2024     HISTORY  Patient arrived today for hearing aid fitting consultation due to known asymmetric sensorineural hearing loss worse in the left ear. History of sudden sensorineural hearing loss in the left ear in . BAHA or BICROS was recommended at the hearing evaluation however patient is not interested in a implantable device at this time.     EVALUATION  Otoscopy revealed clear ear canals and tympanic membrane visualized, bilaterally.    See audiologic evaluation from 10/2023.    IMPRESSIONS:  Today's 1-hour appointment was spent discussing various amplification options. Discussed different styles of amplification.  Discussed at length the various hearing aid technologies.  Discussed the benefits of monaural vs. binaural amplification (i.e. hearing in background noise/wireless technology/localization cues).  Hearing aid limitations were discussed at length as well as realistic expectations.  The patient was advised in order to receive full benefit of amplification, consistent use during all waking hours is recommended.  Hearing aid(s) are not a cure for hearing loss. Following the discussion, the patient will order:    1 Left Phonak CROS L R in P1 or P0 with size 1 CROS . Medium open domes  1 Right Phonak Audeo L30 R in P1 or P0 with size 1 moderate . Medium open domes    The patient was quoted $3240.00 today for amplification and dispensing fee listed above. His wife is out of the country and will return this weekend. He will discuss with her and call when he is ready to proceed with amplification.    RECOMMENDATIONS:  Continue medical follow-up with physician.  Return for audiologic assessment in conjunction with otologic care or annually.   Return for hearing aid fitting in 2-3 weeks.     PATIENT EDUCATION:   Discussed results and recommendations with Nico Spain.  Questions  were addressed and the patient was encouraged to contact our department ([phone]) should concerns arise.    ALMA Duran, CCC-A  Senior Clinical Audiologist    TIME: [phone]

## 2024-06-24 PROBLEM — S83.90XA SPRAIN OF KNEE: Status: ACTIVE | Noted: 2024-06-24

## 2024-06-24 PROBLEM — R19.6 HALITOSIS: Status: ACTIVE | Noted: 2024-06-24

## 2024-06-24 PROBLEM — M25.569 KNEE PAIN: Status: ACTIVE | Noted: 2024-06-24

## 2024-06-24 PROBLEM — H91.92 HEARING LOSS OF LEFT EAR: Status: ACTIVE | Noted: 2024-06-24

## 2024-06-24 PROBLEM — N52.9 ERECTILE DYSFUNCTION: Status: ACTIVE | Noted: 2024-06-24

## 2024-06-24 PROBLEM — F12.90 CANNABIS USE DISORDER: Status: ACTIVE | Noted: 2023-03-10

## 2024-07-11 ENCOUNTER — OFFICE VISIT (OUTPATIENT)
Dept: CARDIOLOGY | Facility: CLINIC | Age: 79
End: 2024-07-11
Payer: MEDICARE

## 2024-07-11 VITALS
HEART RATE: 57 BPM | HEIGHT: 75 IN | DIASTOLIC BLOOD PRESSURE: 78 MMHG | BODY MASS INDEX: 22.28 KG/M2 | SYSTOLIC BLOOD PRESSURE: 136 MMHG | OXYGEN SATURATION: 95 % | WEIGHT: 179.2 LBS

## 2024-07-11 DIAGNOSIS — F32.9 MAJOR DEPRESSIVE DISORDER WITH SINGLE EPISODE, REMISSION STATUS UNSPECIFIED: ICD-10-CM

## 2024-07-11 DIAGNOSIS — E78.41 ELEVATED LIPOPROTEIN A LEVEL: ICD-10-CM

## 2024-07-11 DIAGNOSIS — I10 PRIMARY HYPERTENSION: ICD-10-CM

## 2024-07-11 DIAGNOSIS — I25.10 CORONARY ARTERY DISEASE INVOLVING NATIVE CORONARY ARTERY OF NATIVE HEART WITHOUT ANGINA PECTORIS: ICD-10-CM

## 2024-07-11 DIAGNOSIS — I25.2 H/O NON-ST ELEVATION MYOCARDIAL INFARCTION (NSTEMI): ICD-10-CM

## 2024-07-11 DIAGNOSIS — E78.5 DYSLIPIDEMIA: Primary | ICD-10-CM

## 2024-07-11 PROCEDURE — 99214 OFFICE O/P EST MOD 30 MIN: CPT | Performed by: INTERNAL MEDICINE

## 2024-07-11 PROCEDURE — 3075F SYST BP GE 130 - 139MM HG: CPT | Performed by: INTERNAL MEDICINE

## 2024-07-11 PROCEDURE — 1159F MED LIST DOCD IN RCRD: CPT | Performed by: INTERNAL MEDICINE

## 2024-07-11 PROCEDURE — 93000 ELECTROCARDIOGRAM COMPLETE: CPT | Performed by: INTERNAL MEDICINE

## 2024-07-11 PROCEDURE — 3078F DIAST BP <80 MM HG: CPT | Performed by: INTERNAL MEDICINE

## 2024-07-11 RX ORDER — CLOPIDOGREL BISULFATE 75 MG/1
1 TABLET ORAL
COMMUNITY
Start: 2024-06-02 | End: 2024-07-11 | Stop reason: SDUPTHER

## 2024-07-11 RX ORDER — ALIROCUMAB 75 MG/ML
75 INJECTION, SOLUTION SUBCUTANEOUS
Qty: 2 PEN | Refills: 11 | Status: SHIPPED | OUTPATIENT
Start: 2024-07-11 | End: 2024-07-11

## 2024-07-11 RX ORDER — ESCITALOPRAM OXALATE 20 MG/1
20 TABLET ORAL DAILY
Qty: 90 TABLET | Refills: 0 | Status: SHIPPED | OUTPATIENT
Start: 2024-07-11

## 2024-07-11 RX ORDER — ROSUVASTATIN CALCIUM 40 MG/1
40 TABLET, COATED ORAL DAILY
Qty: 90 TABLET | Refills: 3 | Status: SHIPPED | OUTPATIENT
Start: 2024-07-11 | End: 2025-07-11

## 2024-07-11 RX ORDER — LISINOPRIL 5 MG/1
5 TABLET ORAL DAILY
Qty: 90 TABLET | Refills: 3 | Status: SHIPPED | OUTPATIENT
Start: 2024-07-11 | End: 2025-07-11

## 2024-07-11 RX ORDER — METOPROLOL SUCCINATE 50 MG/1
50 TABLET, EXTENDED RELEASE ORAL DAILY
Qty: 90 TABLET | Refills: 3 | Status: SHIPPED | OUTPATIENT
Start: 2024-07-11 | End: 2025-07-11

## 2024-07-11 RX ORDER — NITROGLYCERIN 0.4 MG/1
0.4 TABLET SUBLINGUAL EVERY 5 MIN PRN
Qty: 30 TABLET | Refills: 2 | Status: SHIPPED | OUTPATIENT
Start: 2024-07-11 | End: 2024-08-10

## 2024-07-11 RX ORDER — ALIROCUMAB 75 MG/ML
75 INJECTION, SOLUTION SUBCUTANEOUS
Qty: 2 ML | Refills: 11 | Status: SHIPPED | OUTPATIENT
Start: 2024-07-11 | End: 2025-07-11

## 2024-07-11 RX ORDER — CLOPIDOGREL BISULFATE 75 MG/1
75 TABLET ORAL
Qty: 90 TABLET | Refills: 3 | Status: SHIPPED | OUTPATIENT
Start: 2024-07-11 | End: 2025-07-11

## 2024-07-11 NOTE — PATIENT INSTRUCTIONS
We will re-send Praulent for Lp(a) elevation - pharmacy may call you. If approved, repeat Lp(a) and cholesterol panel in 3 months after starting.  Follow up with Dr. Evelyn Freeman in [facility] in 1 year.  If any issues, call us.    [facility]    Cynthia, RN/Clinic Nurse for:  Dr. Ezra Dixon    [address].  [address]  [address]  [place] [address]    Phone: [phone]   Press Option 5 then Option 2 to speak with Cynthia.    ________________________________________    To Reach:    Scheduling / Rescheduling -  Option 1  Refills / Medication Requests -  Option 3  General Office / [place] -  Option 4  Results -     Option 6  Medical Records -    Option 7  Repeat Options -    Option 9        Ezra Dixon MD, FACC, FSCAI, RPVI  Co-Director, Vascular Center, and  Co-Director, Pulmonary Embolism Response Team,   [facility]                                 of Medicine,                                                                 [facility] [employer]

## 2024-07-11 NOTE — LETTER
July 15, 2024     Tyra Castle MD  [address]  [address]  [facility] [address]    Patient: Nico Spain   YOB: 1945   Date of Visit: 7/11/2024       Dear Dr. Tyra Castle MD:    Thank you for referring Nico Spain to me for evaluation. Below are my notes for this consultation.  If you have questions, please do not hesitate to call me. I look forward to following your patient along with you.       Sincerely,     Ezra Dixon MD      CC: No Recipients  ______________________________________________________________________________________    PCP: Tyra Castle MD    Subjective  Nico Spain is a 79 y.o. male who is here for follow up of  CAD s/p PCI for NSTEMI, HTN, DLD, mild AS/AI .  Since last visit, denies any new symptoms.  Remains active, traveling.     Review of Systems:  Otherwise, limited cardiovascular review of systems is negative.    Past Medical History:  He has a past medical history of Personal history of other medical treatment, Personal history of other medical treatment, and Personal history of other medical treatment.    Surgical History:   He has a past surgical history that includes Other surgical history (01/26/2022).    Family History:   Family History   Problem Relation Name Age of Onset   • No Known Problems Mother     • Parkinsonism Father       Family History   Problem Relation Name Age of Onset   • No Known Problems Mother     • Parkinsonism Father         Social History:   Tobacco Use: Low Risk  (7/11/2024)    Patient History    • Smoking Tobacco Use: Never    • Smokeless Tobacco Use: Never    • Passive Exposure: Never       Outpatient Medications:    Current Outpatient Medications:   •  clopidogrel (Plavix) 75 mg tablet, Take 1 tablet (75 mg) by mouth early in the morning.., Disp: , Rfl:   •  escitalopram (Lexapro) 20 mg tablet, TAKE 1 TABLET BY MOUTH EVERY DAY, Disp: 90 tablet, Rfl: 0  •  nitroglycerin (Nitrostat) 0.4 mg SL tablet, Place 1 tablet (0.4 mg) under the tongue  "every 5 minutes if needed., Disp: , Rfl:   •  tadalafil (Cialis) 20 mg tablet, Take 1 tablet (20 mg) by mouth once daily as needed for erectile dysfunction., Disp: 12 tablet, Rfl: 3  •  traZODone (Desyrel) 50 mg tablet, TAKE 1 TABLET (50 MG) BY MOUTH ONCE DAILY AT BEDTIME., Disp: 90 tablet, Rfl: 0  •  lisinopril 5 mg tablet, TAKE 1 TABLET BY MOUTH EVERY DAY, Disp: 90 tablet, Rfl: 3  •  metoprolol succinate XL (Toprol-XL) 50 mg 24 hr tablet, TAKE 1 TABLET DAILY., Disp: 90 tablet, Rfl: 3  •  rosuvastatin (Crestor) 40 mg tablet, TAKE 1 TABLET DAILY., Disp: 90 tablet, Rfl: 3     Allergies:  Vancomycin       Objective  Vital Signs:  /78 (BP Location: Left arm, Patient Position: Sitting, BP Cuff Size: Adult)   Pulse 57   Ht 1.905 m (6' 3\")   Wt 81.3 kg (179 lb 3.2 oz)   SpO2 95%   BMI 22.40 kg/m²     Physical Exam:  General: no acute distress  HEENT: EOMI, no scleral icterus.  Lungs: Clear to auscultation bilaterally without wheezing, rales, or rhonchi.  Cardiovascular: Regular rhythm and rate. Normal S1 and S2. No murmurs, rubs, or gallops are appreciated. JVP normal.  no bruits  Abdomen: Soft, nontender, nondistended. Bowel sounds present.  Extremities: Warm and well perfused with equal 2+ pulses bilaterally.  No edema present.  Neurologic: Alert and oriented x3.    Pertinent Recent Cardiovascular Studies (personally reviewed):  Vascular studies:  ECG 7/11/24:  sinus bradycardia with T wave flattening in anterolateral territory    Laboratory values:  CMP:  Recent Labs     02/07/24  1456 06/28/23  1511 02/23/23  1154 04/06/22  1412 05/26/21  1343 09/23/20  1334 12/27/19  0518 12/26/19  0900    142 140 141 144 142 138 141   K 3.9 4.3 4.4 4.2 4.6 4.4 4.2 4.2    105 103 102 107 104 105 104   CO2 30 31 31 33* 30 31 26 30   ANIONGAP 11 10 10 10 12 11 11 11   BUN 19 18 17 21 22 15 17 22   CREATININE 0.95 0.88 0.93 0.97 1.02 1.38* 0.93 1.13   EGFR 82  --   --   --   --   --   --   --    MG  --   --   --   " "--   --   --  2.30  --      Recent Labs     02/07/24  1456 06/28/23  1511 02/23/23  1154 04/06/22  1412 05/26/21  1343   ALBUMIN 4.4 4.4 4.3 4.4 4.3   ALKPHOS 64 64 53 48 68   ALT 18 17 15 14 26   AST 21 19 16 18 29   BILITOT 0.8 0.7 0.8 0.8 0.8     CBC:  Recent Labs     02/07/24  1456 06/28/23  1511 02/23/23  1154 04/06/22  1412 05/26/21  1342 09/23/20  1334 12/27/19  0518 12/26/19  0900   WBC 8.5 6.9 5.9 6.0 7.4 9.4 8.0 5.9   HGB 15.1 14.1 13.4* 14.0 13.9 13.7 14.6 14.2   HCT 43.8 40.0* 39.5* 40.8* 41.2 40.1* 43.6 42.0    169 167 145* 184 169 180 190   MCV 94 95 98 99 99 98 96 96     COAG:   Recent Labs     12/26/19  0900   INR 1.0     ABO: No results for input(s): \"ABO\" in the last 20866 hours.  HEME/ENDO:  Recent Labs     05/26/21  1343 09/23/20  1334 11/06/19  1356 12/20/17  1429   IRONSAT 40 45 36 27      CARDIAC:   Recent Labs     02/07/24  1456        Recent Labs     07/27/23  0856 06/28/23  1511 04/27/22  0824   CHOL 143 172 148   LDLF 73 99 78   HDL 57.0 54.4 56.8   TRIG 64 93 67     MICRO:   Recent Labs     12/26/19  0900   CRP 0.14     No results found for the last 90 days.         I have personally reviewed most recent PCP, cardiology, vascular, and/or podiatry documentation.      Assessment/Plan  79 y.o. male with  CAD s/p PCI LAD and Cx in 2019 for NSTEMI  in the background of dyslipidemia, hypertension, and mild AS/AI, glaucoma, hairy cell leukemia .    Patient continues to be quite active; he is part of a hiking club. Planning for additional travel to [facility].     Echo 10/22: LVEF 65 to 70%, mild AS/AI.   Lp(a) 7/27/23: 77 - attempted conversion to PCSK9 but insurance issues with 1st attempt    Plan:  Renewed medications x 1 year, including P2Y12 as SAPT  Referral for  specialty pharmacy for Praluent (preferred per his insurance)  Additional resource for [id number]-REPATHA given   Once he starts PCSK9, can stop statin and obtain repeat FLP and Lp(a) at 3 months  Transition of care to " Evelyn Freeman in [facility] to allow pt to be closer to cardiologist - communicated with Dr. Pete Dixon MD, FACC, FSCAI, RPVI  Co-Director, Vascular Center, and  Co-Director, Pulmonary Embolism Response Team,  [facility] [place]                            of Medicine,                                                                [facility] [employer]

## 2024-07-11 NOTE — PROGRESS NOTES
PCP: Tyra Castle MD    Subjective   Nico Spain is a 79 y.o. male who is here for follow up of  CAD s/p PCI for NSTEMI, HTN, DLD, mild AS/AI .  Since last visit, denies any new symptoms.  Remains active, traveling.     Review of Systems:  Otherwise, limited cardiovascular review of systems is negative.    Past Medical History:  He has a past medical history of Personal history of other medical treatment, Personal history of other medical treatment, and Personal history of other medical treatment.    Surgical History:   He has a past surgical history that includes Other surgical history (01/26/2022).    Family History:   Family History   Problem Relation Name Age of Onset    No Known Problems Mother      Parkinsonism Father       Family History   Problem Relation Name Age of Onset    No Known Problems Mother      Parkinsonism Father         Social History:   Tobacco Use: Low Risk  (7/11/2024)    Patient History     Smoking Tobacco Use: Never     Smokeless Tobacco Use: Never     Passive Exposure: Never       Outpatient Medications:    Current Outpatient Medications:     clopidogrel (Plavix) 75 mg tablet, Take 1 tablet (75 mg) by mouth early in the morning.., Disp: , Rfl:     escitalopram (Lexapro) 20 mg tablet, TAKE 1 TABLET BY MOUTH EVERY DAY, Disp: 90 tablet, Rfl: 0    nitroglycerin (Nitrostat) 0.4 mg SL tablet, Place 1 tablet (0.4 mg) under the tongue every 5 minutes if needed., Disp: , Rfl:     tadalafil (Cialis) 20 mg tablet, Take 1 tablet (20 mg) by mouth once daily as needed for erectile dysfunction., Disp: 12 tablet, Rfl: 3    traZODone (Desyrel) 50 mg tablet, TAKE 1 TABLET (50 MG) BY MOUTH ONCE DAILY AT BEDTIME., Disp: 90 tablet, Rfl: 0    lisinopril 5 mg tablet, TAKE 1 TABLET BY MOUTH EVERY DAY, Disp: 90 tablet, Rfl: 3    metoprolol succinate XL (Toprol-XL) 50 mg 24 hr tablet, TAKE 1 TABLET DAILY., Disp: 90 tablet, Rfl: 3    rosuvastatin (Crestor) 40 mg tablet, TAKE 1 TABLET DAILY., Disp: 90 tablet,  "Rfl: 3     Allergies:  Vancomycin       Objective   Vital Signs:  /78 (BP Location: Left arm, Patient Position: Sitting, BP Cuff Size: Adult)   Pulse 57   Ht 1.905 m (6' 3\")   Wt 81.3 kg (179 lb 3.2 oz)   SpO2 95%   BMI 22.40 kg/m²     Physical Exam:  General: no acute distress  HEENT: EOMI, no scleral icterus.  Lungs: Clear to auscultation bilaterally without wheezing, rales, or rhonchi.  Cardiovascular: Regular rhythm and rate. Normal S1 and S2. No murmurs, rubs, or gallops are appreciated. JVP normal.  no bruits  Abdomen: Soft, nontender, nondistended. Bowel sounds present.  Extremities: Warm and well perfused with equal 2+ pulses bilaterally.  No edema present.  Neurologic: Alert and oriented x3.    Pertinent Recent Cardiovascular Studies (personally reviewed):  Vascular studies:  ECG 7/11/24:  sinus bradycardia with T wave flattening in anterolateral territory    Laboratory values:  CMP:  Recent Labs     02/07/24  1456 06/28/23  1511 02/23/23  1154 04/06/22  1412 05/26/21  1343 09/23/20  1334 12/27/19  0518 12/26/19  0900    142 140 141 144 142 138 141   K 3.9 4.3 4.4 4.2 4.6 4.4 4.2 4.2    105 103 102 107 104 105 104   CO2 30 31 31 33* 30 31 26 30   ANIONGAP 11 10 10 10 12 11 11 11   BUN 19 18 17 21 22 15 17 22   CREATININE 0.95 0.88 0.93 0.97 1.02 1.38* 0.93 1.13   EGFR 82  --   --   --   --   --   --   --    MG  --   --   --   --   --   --  2.30  --      Recent Labs     02/07/24  1456 06/28/23  1511 02/23/23  1154 04/06/22  1412 05/26/21  1343   ALBUMIN 4.4 4.4 4.3 4.4 4.3   ALKPHOS 64 64 53 48 68   ALT 18 17 15 14 26   AST 21 19 16 18 29   BILITOT 0.8 0.7 0.8 0.8 0.8     CBC:  Recent Labs     02/07/24  1456 06/28/23  1511 02/23/23  1154 04/06/22  1412 05/26/21  1342 09/23/20  1334 12/27/19  0518 12/26/19  0900   WBC 8.5 6.9 5.9 6.0 7.4 9.4 8.0 5.9   HGB 15.1 14.1 13.4* 14.0 13.9 13.7 14.6 14.2   HCT 43.8 40.0* 39.5* 40.8* 41.2 40.1* 43.6 42.0    169 167 145* 184 169 180 190 " "  MCV 94 95 98 99 99 98 96 96     COAG:   Recent Labs     12/26/19  0900   INR 1.0     ABO: No results for input(s): \"ABO\" in the last 39590 hours.  HEME/ENDO:  Recent Labs     05/26/21  1343 09/23/20  1334 11/06/19  1356 12/20/17  1429   IRONSAT 40 45 36 27      CARDIAC:   Recent Labs     02/07/24  1456        Recent Labs     07/27/23  0856 06/28/23  1511 04/27/22  0824   CHOL 143 172 148   LDLF 73 99 78   HDL 57.0 54.4 56.8   TRIG 64 93 67     MICRO:   Recent Labs     12/26/19  0900   CRP 0.14     No results found for the last 90 days.         I have personally reviewed most recent PCP, cardiology, vascular, and/or podiatry documentation.      Assessment/Plan   79 y.o. male with  CAD s/p PCI LAD and Cx in 2019 for NSTEMI  in the background of dyslipidemia, hypertension, and mild AS/AI, glaucoma, hairy cell leukemia .    Patient continues to be quite active; he is part of a hiking club. Planning for additional travel to [place] [employer].     Echo 10/22: LVEF 65 to 70%, mild AS/AI.   Lp(a) 7/27/23: 77 - attempted conversion to PCSK9 but insurance issues with 1st attempt    Plan:  Renewed medications x 1 year, including P2Y12 as SAPT  Referral for  specialty pharmacy for Praluent (preferred per his insurance)  Additional resource for 844-REPATHA given   Once he starts PCSK9, can stop statin and obtain repeat FLP and Lp(a) at 3 months  Transition of care to Dr. Evelyn Freeman in [facility] to allow pt to be closer to cardiologist - communicated with Dr. Pete Dixon MD, FACC, FSCAI, RPVI  Co-Director, Vascular Center, and  Co-Director, Pulmonary Embolism Response Team,  [facility] [place]                            of Medicine,                                                                [facility] [employer]      "

## 2024-07-12 ENCOUNTER — SPECIALTY PHARMACY (OUTPATIENT)
Dept: PHARMACY | Facility: CLINIC | Age: 79
End: 2024-07-12

## 2024-07-17 ENCOUNTER — OFFICE VISIT (OUTPATIENT)
Dept: HEMATOLOGY/ONCOLOGY | Facility: HOSPITAL | Age: 79
End: 2024-07-17
Payer: MEDICARE

## 2024-07-17 ENCOUNTER — LAB (OUTPATIENT)
Dept: LAB | Facility: HOSPITAL | Age: 79
End: 2024-07-17
Payer: MEDICARE

## 2024-07-17 VITALS
DIASTOLIC BLOOD PRESSURE: 68 MMHG | HEART RATE: 69 BPM | RESPIRATION RATE: 18 BRPM | TEMPERATURE: 97.7 F | OXYGEN SATURATION: 97 % | BODY MASS INDEX: 21.89 KG/M2 | SYSTOLIC BLOOD PRESSURE: 139 MMHG | WEIGHT: 175.1 LBS

## 2024-07-17 DIAGNOSIS — C95.91 LEUKEMIA IN REMISSION, UNSPECIFIED LEUKEMIA TYPE (MULTI): ICD-10-CM

## 2024-07-17 LAB
ALBUMIN SERPL BCP-MCNC: 4.2 G/DL (ref 3.4–5)
ALP SERPL-CCNC: 61 U/L (ref 33–136)
ALT SERPL W P-5'-P-CCNC: 15 U/L (ref 10–52)
ANION GAP SERPL CALC-SCNC: 12 MMOL/L (ref 10–20)
AST SERPL W P-5'-P-CCNC: 18 U/L (ref 9–39)
BASOPHILS # BLD AUTO: 0.02 X10*3/UL (ref 0–0.1)
BASOPHILS NFR BLD AUTO: 0.3 %
BILIRUB DIRECT SERPL-MCNC: 0.2 MG/DL (ref 0–0.3)
BILIRUB SERPL-MCNC: 0.8 MG/DL (ref 0–1.2)
BUN SERPL-MCNC: 19 MG/DL (ref 6–23)
CALCIUM SERPL-MCNC: 9 MG/DL (ref 8.6–10.3)
CHLORIDE SERPL-SCNC: 107 MMOL/L (ref 98–107)
CO2 SERPL-SCNC: 26 MMOL/L (ref 21–32)
CREAT SERPL-MCNC: 0.99 MG/DL (ref 0.5–1.3)
EGFRCR SERPLBLD CKD-EPI 2021: 78 ML/MIN/1.73M*2
EOSINOPHIL # BLD AUTO: 0.13 X10*3/UL (ref 0–0.4)
EOSINOPHIL NFR BLD AUTO: 2 %
ERYTHROCYTE [DISTWIDTH] IN BLOOD BY AUTOMATED COUNT: 11.3 % (ref 11.5–14.5)
GLUCOSE SERPL-MCNC: 141 MG/DL (ref 74–99)
HCT VFR BLD AUTO: 40.4 % (ref 41–52)
HGB BLD-MCNC: 14.2 G/DL (ref 13.5–17.5)
IGA SERPL-MCNC: 216 MG/DL (ref 70–400)
IGG SERPL-MCNC: 1110 MG/DL (ref 700–1600)
IGM SERPL-MCNC: 24 MG/DL (ref 40–230)
IMM GRANULOCYTES # BLD AUTO: 0.02 X10*3/UL (ref 0–0.5)
IMM GRANULOCYTES NFR BLD AUTO: 0.3 % (ref 0–0.9)
LDH SERPL L TO P-CCNC: 155 U/L (ref 84–246)
LYMPHOCYTES # BLD AUTO: 1.68 X10*3/UL (ref 0.8–3)
LYMPHOCYTES NFR BLD AUTO: 25.3 %
MCH RBC QN AUTO: 33 PG (ref 26–34)
MCHC RBC AUTO-ENTMCNC: 35.1 G/DL (ref 32–36)
MCV RBC AUTO: 94 FL (ref 80–100)
MONOCYTES # BLD AUTO: 0.57 X10*3/UL (ref 0.05–0.8)
MONOCYTES NFR BLD AUTO: 8.6 %
NEUTROPHILS # BLD AUTO: 4.22 X10*3/UL (ref 1.6–5.5)
NEUTROPHILS NFR BLD AUTO: 63.5 %
NRBC BLD-RTO: 0 /100 WBCS (ref 0–0)
PHOSPHATE SERPL-MCNC: 3.6 MG/DL (ref 2.5–4.9)
PLATELET # BLD AUTO: 173 X10*3/UL (ref 150–450)
POTASSIUM SERPL-SCNC: 3.7 MMOL/L (ref 3.5–5.3)
PROT SERPL-MCNC: 6.7 G/DL (ref 6.4–8.2)
PROT SERPL-MCNC: 6.9 G/DL (ref 6.4–8.2)
RBC # BLD AUTO: 4.3 X10*6/UL (ref 4.5–5.9)
SODIUM SERPL-SCNC: 141 MMOL/L (ref 136–145)
WBC # BLD AUTO: 6.6 X10*3/UL (ref 4.4–11.3)

## 2024-07-17 PROCEDURE — 82784 ASSAY IGA/IGD/IGG/IGM EACH: CPT | Performed by: INTERNAL MEDICINE

## 2024-07-17 PROCEDURE — 84100 ASSAY OF PHOSPHORUS: CPT | Performed by: INTERNAL MEDICINE

## 2024-07-17 PROCEDURE — 84155 ASSAY OF PROTEIN SERUM: CPT | Performed by: INTERNAL MEDICINE

## 2024-07-17 PROCEDURE — 3078F DIAST BP <80 MM HG: CPT | Performed by: INTERNAL MEDICINE

## 2024-07-17 PROCEDURE — 99213 OFFICE O/P EST LOW 20 MIN: CPT | Performed by: INTERNAL MEDICINE

## 2024-07-17 PROCEDURE — 84165 PROTEIN E-PHORESIS SERUM: CPT | Performed by: INTERNAL MEDICINE

## 2024-07-17 PROCEDURE — 1126F AMNT PAIN NOTED NONE PRSNT: CPT | Performed by: INTERNAL MEDICINE

## 2024-07-17 PROCEDURE — 36415 COLL VENOUS BLD VENIPUNCTURE: CPT

## 2024-07-17 PROCEDURE — 80053 COMPREHEN METABOLIC PANEL: CPT

## 2024-07-17 PROCEDURE — 83615 LACTATE (LD) (LDH) ENZYME: CPT

## 2024-07-17 PROCEDURE — 1036F TOBACCO NON-USER: CPT | Performed by: INTERNAL MEDICINE

## 2024-07-17 PROCEDURE — 3075F SYST BP GE 130 - 139MM HG: CPT | Performed by: INTERNAL MEDICINE

## 2024-07-17 PROCEDURE — 82248 BILIRUBIN DIRECT: CPT | Performed by: INTERNAL MEDICINE

## 2024-07-17 PROCEDURE — 1159F MED LIST DOCD IN RCRD: CPT | Performed by: INTERNAL MEDICINE

## 2024-07-17 PROCEDURE — 85025 COMPLETE CBC W/AUTO DIFF WBC: CPT

## 2024-07-17 ASSESSMENT — PAIN SCALES - GENERAL: PAINLEVEL: 0-NO PAIN

## 2024-07-17 NOTE — PROGRESS NOTES
Patient ID: Nico Spain is a 79 y.o. male.    Subjective    HPI  Hairy cell in remission  Cardiac is OK  Post rx  Sees cardiologist   On statins for LDL  Stable SPEP travel to [place] in April.      Objective    BSA: 2.05 meters squared  /68 (BP Location: Left arm, Patient Position: Sitting, BP Cuff Size: Adult)   Pulse 69   Temp 36.5 °C (97.7 °F)   Resp 18   Wt 79.4 kg (175 lb 1.6 oz)   SpO2 97%   BMI 21.89 kg/m²      Physical Exam  No spleen liver nsr  No nodes lungs clear  Skin only eccymoses from blood thinner dermeval OK  Performance Status:  Symptomatic; fully ambulatory      Assessment/Plan   Hairy cell leukemia in remission    Cancer Staging   No matching staging information was found for the patient.      Oncology History    No history exists.        Diagnoses and all orders for this visit:  Leukemia in remission, unspecified leukemia type (Multi)  -     Clinic Appointment Request           Buddy Lazar MD

## 2024-07-18 LAB
ALBUMIN: 4.2 G/DL (ref 3.4–5)
ALPHA 1 GLOBULIN: 0.2 G/DL (ref 0.2–0.6)
ALPHA 2 GLOBULIN: 0.5 G/DL (ref 0.4–1.1)
BETA GLOBULIN: 0.7 G/DL (ref 0.5–1.2)
GAMMA GLOBULIN: 1 G/DL (ref 0.5–1.4)
PATH REVIEW-SERUM PROTEIN ELECTROPHORESIS: NORMAL
PROTEIN ELECTROPHORESIS COMMENT: NORMAL

## 2024-07-26 ENCOUNTER — TELEPHONE (OUTPATIENT)
Dept: CARDIOLOGY | Facility: CLINIC | Age: 79
End: 2024-07-26
Payer: MEDICARE

## 2024-07-26 NOTE — TELEPHONE ENCOUNTER
"Per Dr Dixon...    \"I found out after pt left that if he calls [phone], they can provide assistance financially.   His insurance \"prefers\" Praulent so that is sent, but can you ask him to look into the 844 number?     I did also send an inbox msg to Dr. Freeman to have him follow up w her at [facility] to be closer to that market. \"    I called pt the phone number, if he would like to look into Repatha instead of Praluent. Pt will consider it and let us know.   "

## 2024-09-20 DIAGNOSIS — F51.01 PRIMARY INSOMNIA: ICD-10-CM

## 2024-09-20 RX ORDER — TRAZODONE HYDROCHLORIDE 50 MG/1
50 TABLET ORAL NIGHTLY
Qty: 90 TABLET | Refills: 0 | Status: SHIPPED | OUTPATIENT
Start: 2024-09-20

## 2024-10-06 DIAGNOSIS — F32.9 MAJOR DEPRESSIVE DISORDER WITH SINGLE EPISODE, REMISSION STATUS UNSPECIFIED: ICD-10-CM

## 2024-10-07 RX ORDER — ESCITALOPRAM OXALATE 20 MG/1
20 TABLET ORAL DAILY
Qty: 90 TABLET | Refills: 0 | Status: SHIPPED | OUTPATIENT
Start: 2024-10-07

## 2024-10-15 ENCOUNTER — TELEPHONE (OUTPATIENT)
Dept: PRIMARY CARE | Facility: CLINIC | Age: 79
End: 2024-10-15
Payer: MEDICARE

## 2024-10-15 NOTE — TELEPHONE ENCOUNTER
Patient states that he injured his toe. He says that is swollen and discolored, he has it taped to another toe right now but wants advice on what else should be done.

## 2024-11-27 ENCOUNTER — APPOINTMENT (OUTPATIENT)
Dept: PRIMARY CARE | Facility: CLINIC | Age: 79
End: 2024-11-27
Payer: MEDICARE

## 2024-12-11 ENCOUNTER — APPOINTMENT (OUTPATIENT)
Dept: HEMATOLOGY/ONCOLOGY | Facility: HOSPITAL | Age: 79
End: 2024-12-11
Payer: MEDICARE

## 2024-12-14 DIAGNOSIS — F51.01 PRIMARY INSOMNIA: ICD-10-CM

## 2024-12-15 RX ORDER — TRAZODONE HYDROCHLORIDE 50 MG/1
50 TABLET ORAL NIGHTLY
Qty: 90 TABLET | Refills: 0 | Status: SHIPPED | OUTPATIENT
Start: 2024-12-15

## 2024-12-23 ENCOUNTER — APPOINTMENT (OUTPATIENT)
Dept: PRIMARY CARE | Facility: CLINIC | Age: 79
End: 2024-12-23
Payer: MEDICARE

## 2024-12-23 VITALS
DIASTOLIC BLOOD PRESSURE: 70 MMHG | WEIGHT: 174 LBS | SYSTOLIC BLOOD PRESSURE: 116 MMHG | HEART RATE: 63 BPM | BODY MASS INDEX: 21.64 KG/M2 | TEMPERATURE: 97.7 F | HEIGHT: 75 IN | OXYGEN SATURATION: 98 %

## 2024-12-23 DIAGNOSIS — N52.9 ERECTILE DYSFUNCTION, UNSPECIFIED ERECTILE DYSFUNCTION TYPE: ICD-10-CM

## 2024-12-23 DIAGNOSIS — I10 PRIMARY HYPERTENSION: ICD-10-CM

## 2024-12-23 DIAGNOSIS — E78.5 DYSLIPIDEMIA: ICD-10-CM

## 2024-12-23 DIAGNOSIS — F32.9 MAJOR DEPRESSIVE DISORDER WITH SINGLE EPISODE, REMISSION STATUS UNSPECIFIED: Primary | ICD-10-CM

## 2024-12-23 LAB
CHOLEST SERPL-MCNC: 189 MG/DL (ref 0–199)
CHOLESTEROL/HDL RATIO: 3
HDLC SERPL-MCNC: 62.4 MG/DL
LDLC SERPL CALC-MCNC: 112 MG/DL
NON HDL CHOLESTEROL: 127 MG/DL (ref 0–149)
TRIGL SERPL-MCNC: 71 MG/DL (ref 0–149)
VLDL: 14 MG/DL (ref 0–40)

## 2024-12-23 PROCEDURE — 80061 LIPID PANEL: CPT

## 2024-12-23 PROCEDURE — 1159F MED LIST DOCD IN RCRD: CPT | Performed by: FAMILY MEDICINE

## 2024-12-23 PROCEDURE — 99214 OFFICE O/P EST MOD 30 MIN: CPT | Performed by: FAMILY MEDICINE

## 2024-12-23 PROCEDURE — 3078F DIAST BP <80 MM HG: CPT | Performed by: FAMILY MEDICINE

## 2024-12-23 PROCEDURE — 3074F SYST BP LT 130 MM HG: CPT | Performed by: FAMILY MEDICINE

## 2024-12-23 PROCEDURE — 1123F ACP DISCUSS/DSCN MKR DOCD: CPT | Performed by: FAMILY MEDICINE

## 2024-12-23 PROCEDURE — 1036F TOBACCO NON-USER: CPT | Performed by: FAMILY MEDICINE

## 2024-12-23 PROCEDURE — 1160F RVW MEDS BY RX/DR IN RCRD: CPT | Performed by: FAMILY MEDICINE

## 2024-12-23 RX ORDER — ESCITALOPRAM OXALATE 20 MG/1
20 TABLET ORAL DAILY
Qty: 90 TABLET | Refills: 1 | Status: SHIPPED | OUTPATIENT
Start: 2024-12-23

## 2024-12-23 RX ORDER — TADALAFIL 20 MG/1
20 TABLET ORAL DAILY PRN
Qty: 12 TABLET | Refills: 3 | Status: SHIPPED | OUTPATIENT
Start: 2024-12-23 | End: 2025-12-23

## 2024-12-23 ASSESSMENT — PATIENT HEALTH QUESTIONNAIRE - PHQ9
10. IF YOU CHECKED OFF ANY PROBLEMS, HOW DIFFICULT HAVE THESE PROBLEMS MADE IT FOR YOU TO DO YOUR WORK, TAKE CARE OF THINGS AT HOME, OR GET ALONG WITH OTHER PEOPLE: NOT DIFFICULT AT ALL
2. FEELING DOWN, DEPRESSED OR HOPELESS: SEVERAL DAYS
1. LITTLE INTEREST OR PLEASURE IN DOING THINGS: NOT AT ALL
SUM OF ALL RESPONSES TO PHQ9 QUESTIONS 1 AND 2: 1

## 2024-12-23 ASSESSMENT — ENCOUNTER SYMPTOMS
OCCASIONAL FEELINGS OF UNSTEADINESS: 0
LOSS OF SENSATION IN FEET: 0
DEPRESSION: 0

## 2024-12-23 NOTE — PROGRESS NOTES
"Subjective   Patient ID: Nico Spain is a 79 y.o. male who presents for Follow-up.  Has new cardiologist he will be seeing in the summer.  Current cardiologist let him go, since he is stable.  Currently taking rosuvastatin for cholesterol.  Had been on praluent, but the cost was too much.  Is due to get cholesterol checked, and is fasting this AM.  Hematologist has other blood work ordered prior to his January appointment.  No CP, SOB, or edema.    Has erectile issues, and he thinks low testosterone.  Cialis has worked, when used in the past.    Depression has been stable on escitalopram and trazodone.  No side effects to medications noted.          Review of Systems    Objective   /70 (BP Location: Right arm, Patient Position: Sitting, BP Cuff Size: Adult)   Pulse 63   Temp 36.5 °C (97.7 °F) (Temporal)   Ht 1.905 m (6' 3\")   Wt 78.9 kg (174 lb)   SpO2 98%   BMI 21.75 kg/m²     Physical Exam  Vitals reviewed.   Constitutional:       Appearance: Normal appearance.   Cardiovascular:      Rate and Rhythm: Normal rate and regular rhythm.      Heart sounds: No murmur heard.  Pulmonary:      Effort: Pulmonary effort is normal.      Breath sounds: Normal breath sounds.   Musculoskeletal:      Right lower leg: No edema.      Left lower leg: No edema.   Neurological:      Mental Status: He is alert.   Psychiatric:         Mood and Affect: Mood normal.         Behavior: Behavior normal.           Assessment/Plan   Problem List Items Addressed This Visit       Primary hypertension    Depression - Primary    Relevant Medications    escitalopram (Lexapro) 20 mg tablet    Dyslipidemia    Relevant Orders    Lipid Panel    Erectile dysfunction    Relevant Medications    tadalafil (Cialis) 20 mg tablet     Other Visit Diagnoses       Body mass index (BMI) of 21.0 to 21.9 in adult                   "

## 2024-12-23 NOTE — PATIENT INSTRUCTIONS
For BP and heart, taking  clopidogrel 75 mg, lisinopril 5 mg,  metoprolol ER 50 mg,  and  nitroglycerin  as needed.  Diet should be  low cholesterol,  high fiber, and low in sodium (2000 mg/day or less)  BP controlled.  Seeing cardiology every 6  months. (Will see new one in the summer)    Taking rosuvastatin 40 mg for cholesterol.  Check fasting cholesterol this AM.    Depression and sleep are stable on escitalopram 20 mg and trazodone  50 mg.  Follow up  6 months, can make it a Medicare Wellness visit.    For erectile dysfunction, refill cialis to use as needed.

## 2025-01-14 DIAGNOSIS — I25.10 CORONARY ARTERY DISEASE INVOLVING NATIVE CORONARY ARTERY OF NATIVE HEART WITHOUT ANGINA PECTORIS: ICD-10-CM

## 2025-01-15 ENCOUNTER — LAB (OUTPATIENT)
Dept: LAB | Facility: HOSPITAL | Age: 80
End: 2025-01-15
Payer: MEDICARE

## 2025-01-15 ENCOUNTER — OFFICE VISIT (OUTPATIENT)
Dept: HEMATOLOGY/ONCOLOGY | Facility: HOSPITAL | Age: 80
End: 2025-01-15
Payer: MEDICARE

## 2025-01-15 VITALS
WEIGHT: 176.15 LBS | TEMPERATURE: 97.7 F | DIASTOLIC BLOOD PRESSURE: 78 MMHG | RESPIRATION RATE: 20 BRPM | BODY MASS INDEX: 22.02 KG/M2 | SYSTOLIC BLOOD PRESSURE: 163 MMHG | OXYGEN SATURATION: 98 % | HEART RATE: 65 BPM

## 2025-01-15 DIAGNOSIS — E78.5 DYSLIPIDEMIA: ICD-10-CM

## 2025-01-15 DIAGNOSIS — C95.91 LEUKEMIA IN REMISSION, UNSPECIFIED LEUKEMIA TYPE (MULTI): ICD-10-CM

## 2025-01-15 LAB
ALBUMIN SERPL BCP-MCNC: 4.5 G/DL (ref 3.4–5)
ALP SERPL-CCNC: 63 U/L (ref 33–136)
ALT SERPL W P-5'-P-CCNC: 18 U/L (ref 10–52)
ANION GAP SERPL CALC-SCNC: 13 MMOL/L (ref 10–20)
AST SERPL W P-5'-P-CCNC: 20 U/L (ref 9–39)
BASOPHILS # BLD AUTO: 0.03 X10*3/UL (ref 0–0.1)
BASOPHILS NFR BLD AUTO: 0.4 %
BILIRUB DIRECT SERPL-MCNC: 0.1 MG/DL (ref 0–0.3)
BILIRUB SERPL-MCNC: 0.8 MG/DL (ref 0–1.2)
BUN SERPL-MCNC: 18 MG/DL (ref 6–23)
CALCIUM SERPL-MCNC: 9.7 MG/DL (ref 8.6–10.3)
CHLORIDE SERPL-SCNC: 103 MMOL/L (ref 98–107)
CO2 SERPL-SCNC: 30 MMOL/L (ref 21–32)
CREAT SERPL-MCNC: 0.94 MG/DL (ref 0.5–1.3)
EGFRCR SERPLBLD CKD-EPI 2021: 83 ML/MIN/1.73M*2
EOSINOPHIL # BLD AUTO: 0.13 X10*3/UL (ref 0–0.4)
EOSINOPHIL NFR BLD AUTO: 1.8 %
ERYTHROCYTE [DISTWIDTH] IN BLOOD BY AUTOMATED COUNT: 11.5 % (ref 11.5–14.5)
GLUCOSE SERPL-MCNC: 95 MG/DL (ref 74–99)
HCT VFR BLD AUTO: 42.5 % (ref 41–52)
HGB BLD-MCNC: 14.5 G/DL (ref 13.5–17.5)
IGA SERPL-MCNC: 213 MG/DL (ref 70–400)
IGG SERPL-MCNC: 1230 MG/DL (ref 700–1600)
IGM SERPL-MCNC: 22 MG/DL (ref 40–230)
IMM GRANULOCYTES # BLD AUTO: 0.01 X10*3/UL (ref 0–0.5)
IMM GRANULOCYTES NFR BLD AUTO: 0.1 % (ref 0–0.9)
LDH SERPL L TO P-CCNC: 176 U/L (ref 84–246)
LYMPHOCYTES # BLD AUTO: 1.71 X10*3/UL (ref 0.8–3)
LYMPHOCYTES NFR BLD AUTO: 23.7 %
MCH RBC QN AUTO: 33.3 PG (ref 26–34)
MCHC RBC AUTO-ENTMCNC: 34.1 G/DL (ref 32–36)
MCV RBC AUTO: 98 FL (ref 80–100)
MONOCYTES # BLD AUTO: 0.64 X10*3/UL (ref 0.05–0.8)
MONOCYTES NFR BLD AUTO: 8.9 %
NEUTROPHILS # BLD AUTO: 4.69 X10*3/UL (ref 1.6–5.5)
NEUTROPHILS NFR BLD AUTO: 65.1 %
NRBC BLD-RTO: 0 /100 WBCS (ref 0–0)
PHOSPHATE SERPL-MCNC: 3.9 MG/DL (ref 2.5–4.9)
PLATELET # BLD AUTO: 176 X10*3/UL (ref 150–450)
POTASSIUM SERPL-SCNC: 4.5 MMOL/L (ref 3.5–5.3)
PROT SERPL-MCNC: 7.3 G/DL (ref 6.4–8.2)
PROT SERPL-MCNC: 7.3 G/DL (ref 6.4–8.2)
RBC # BLD AUTO: 4.36 X10*6/UL (ref 4.5–5.9)
SODIUM SERPL-SCNC: 141 MMOL/L (ref 136–145)
WBC # BLD AUTO: 7.2 X10*3/UL (ref 4.4–11.3)

## 2025-01-15 PROCEDURE — 83521 IG LIGHT CHAINS FREE EACH: CPT

## 2025-01-15 PROCEDURE — 82784 ASSAY IGA/IGD/IGG/IGM EACH: CPT

## 2025-01-15 PROCEDURE — 1126F AMNT PAIN NOTED NONE PRSNT: CPT | Performed by: INTERNAL MEDICINE

## 2025-01-15 PROCEDURE — 3077F SYST BP >= 140 MM HG: CPT | Performed by: INTERNAL MEDICINE

## 2025-01-15 PROCEDURE — 85025 COMPLETE CBC W/AUTO DIFF WBC: CPT

## 2025-01-15 PROCEDURE — 1123F ACP DISCUSS/DSCN MKR DOCD: CPT | Performed by: INTERNAL MEDICINE

## 2025-01-15 PROCEDURE — 84155 ASSAY OF PROTEIN SERUM: CPT | Mod: 59

## 2025-01-15 PROCEDURE — 3078F DIAST BP <80 MM HG: CPT | Performed by: INTERNAL MEDICINE

## 2025-01-15 PROCEDURE — 1159F MED LIST DOCD IN RCRD: CPT | Performed by: INTERNAL MEDICINE

## 2025-01-15 PROCEDURE — 36415 COLL VENOUS BLD VENIPUNCTURE: CPT

## 2025-01-15 PROCEDURE — 99213 OFFICE O/P EST LOW 20 MIN: CPT | Performed by: INTERNAL MEDICINE

## 2025-01-15 PROCEDURE — 82172 ASSAY OF APOLIPOPROTEIN: CPT

## 2025-01-15 PROCEDURE — 84100 ASSAY OF PHOSPHORUS: CPT

## 2025-01-15 PROCEDURE — 82248 BILIRUBIN DIRECT: CPT

## 2025-01-15 PROCEDURE — 84165 PROTEIN E-PHORESIS SERUM: CPT

## 2025-01-15 PROCEDURE — 83615 LACTATE (LD) (LDH) ENZYME: CPT

## 2025-01-15 PROCEDURE — 80053 COMPREHEN METABOLIC PANEL: CPT

## 2025-01-15 PROCEDURE — 1036F TOBACCO NON-USER: CPT | Performed by: INTERNAL MEDICINE

## 2025-01-15 PROCEDURE — 99213 OFFICE O/P EST LOW 20 MIN: CPT | Mod: 1F | Performed by: INTERNAL MEDICINE

## 2025-01-15 ASSESSMENT — PAIN SCALES - GENERAL: PAINLEVEL_OUTOF10: 0-NO PAIN

## 2025-01-15 NOTE — PROGRESS NOTES
Patient ID: Nico Spain is a 79 y.o. male.    Subjective    HPI    Noted elevt BP today from 1 m ago well  Fall [place]  In [place] wife had gall bladder.    Labs are fine    Objective    BSA: 2.06 meters squared  /78 (BP Location: Left arm, Patient Position: Sitting, BP Cuff Size: Adult)   Pulse 65   Temp 36.5 °C (97.7 °F) (Temporal)   Resp 20   Wt 79.9 kg (176 lb 2.4 oz)   SpO2 98%   BMI 22.02 kg/m²      Physical Exam    Performance Status:  Symptomatic; fully ambulatory      Assessment/Plan   Clinically well will recheck BP    Cancer Staging   No matching staging information was found for the patient.    No recurrence of hairy cell  Oncology History    No history exists.        Diagnoses and all orders for this visit:  Leukemia in remission, unspecified leukemia type (Multi)  -     Clinic Appointment Request           Buddy Lazar MD

## 2025-01-16 LAB
ALBUMIN: 4.6 G/DL (ref 3.4–5)
ALPHA 1 GLOBULIN: 0.3 G/DL (ref 0.2–0.6)
ALPHA 2 GLOBULIN: 0.6 G/DL (ref 0.4–1.1)
BETA GLOBULIN: 0.8 G/DL (ref 0.5–1.2)
GAMMA GLOBULIN: 1.1 G/DL (ref 0.5–1.4)
KAPPA LC SERPL-MCNC: 2.24 MG/DL (ref 0.33–1.94)
KAPPA LC/LAMBDA SER: 1.78 {RATIO} (ref 0.26–1.65)
LAMBDA LC SERPL-MCNC: 1.26 MG/DL (ref 0.57–2.63)
PATH REVIEW-SERUM PROTEIN ELECTROPHORESIS: NORMAL
PROTEIN ELECTROPHORESIS COMMENT: NORMAL

## 2025-01-17 RX ORDER — NITROGLYCERIN 0.4 MG/1
0.4 TABLET SUBLINGUAL EVERY 5 MIN PRN
Qty: 25 TABLET | Refills: 2 | Status: SHIPPED | OUTPATIENT
Start: 2025-01-17

## 2025-01-18 LAB — LPA SERPL-MCNC: 99 MG/DL

## 2025-03-14 DIAGNOSIS — F51.01 PRIMARY INSOMNIA: ICD-10-CM

## 2025-03-14 RX ORDER — TRAZODONE HYDROCHLORIDE 50 MG/1
50 TABLET ORAL NIGHTLY
Qty: 90 TABLET | Refills: 1 | Status: SHIPPED | OUTPATIENT
Start: 2025-03-14

## 2025-07-07 ENCOUNTER — APPOINTMENT (OUTPATIENT)
Dept: PRIMARY CARE | Facility: CLINIC | Age: 80
End: 2025-07-07
Payer: MEDICARE

## 2025-07-07 VITALS
OXYGEN SATURATION: 96 % | WEIGHT: 173.8 LBS | BODY MASS INDEX: 21.61 KG/M2 | TEMPERATURE: 98 F | SYSTOLIC BLOOD PRESSURE: 130 MMHG | DIASTOLIC BLOOD PRESSURE: 72 MMHG | HEART RATE: 66 BPM | HEIGHT: 75 IN

## 2025-07-07 DIAGNOSIS — R10.31 ABDOMINAL PAIN, RLQ: Primary | ICD-10-CM

## 2025-07-07 PROCEDURE — 1159F MED LIST DOCD IN RCRD: CPT | Performed by: FAMILY MEDICINE

## 2025-07-07 PROCEDURE — 3075F SYST BP GE 130 - 139MM HG: CPT | Performed by: FAMILY MEDICINE

## 2025-07-07 PROCEDURE — 3078F DIAST BP <80 MM HG: CPT | Performed by: FAMILY MEDICINE

## 2025-07-07 PROCEDURE — 1160F RVW MEDS BY RX/DR IN RCRD: CPT | Performed by: FAMILY MEDICINE

## 2025-07-07 PROCEDURE — 99213 OFFICE O/P EST LOW 20 MIN: CPT | Performed by: FAMILY MEDICINE

## 2025-07-07 PROCEDURE — 1125F AMNT PAIN NOTED PAIN PRSNT: CPT | Performed by: FAMILY MEDICINE

## 2025-07-07 ASSESSMENT — PAIN SCALES - GENERAL: PAINLEVEL_OUTOF10: 2

## 2025-07-07 NOTE — PROGRESS NOTES
"Subjective   Patient ID: Nico Spain is a 80 y.o. male who presents for Hip Pain.  Patient presents with complaints of some pain in the right lower abdomen/groin area that will radiate down the leg and across the abdomen.  It had begun about  2-3 weeks ago without apparent trigger.  No specific incident recalled, although he had been moving some furniture lately.  Pain is 5/10 at its worst, although it has been feeling some better now.  No meds tried, since it wasn't bad enough for that.  It would be relieved by laying down.  No bowel changes, nausea or vomiting.    Previously had left inguinal hernia repair, in 2023.    Pain under right shoulder blade had occurred around the same time.  Was worse with moving left shoulder around.  Not as bad now.  No tingling down the arm.          Review of Systems    Objective   /72 (BP Location: Right arm, Patient Position: Sitting, BP Cuff Size: Adult)   Pulse 66   Temp 36.7 °C (98 °F) (Temporal)   Ht 1.905 m (6' 3\")   Wt 78.8 kg (173 lb 12.8 oz)   SpO2 96%   BMI 21.72 kg/m²     Physical Exam  Vitals reviewed.   Constitutional:       Appearance: Normal appearance.   Cardiovascular:      Rate and Rhythm: Normal rate and regular rhythm.      Heart sounds: No murmur heard.  Pulmonary:      Effort: Pulmonary effort is normal.      Breath sounds: Normal breath sounds.   Abdominal:      General: Abdomen is flat.      Palpations: Abdomen is soft. There is no mass.      Tenderness: There is no abdominal tenderness (but notes it had been tender at low RLQ previously). There is no guarding or rebound.      Comments: No tenderness in inguinal area.     Musculoskeletal:      Right shoulder: No swelling or tenderness. Normal range of motion.      Lumbar back: No spasms or tenderness. Negative right straight leg raise test and negative left straight leg raise test.      Right hip: No tenderness. Normal range of motion. Normal strength.   Neurological:      Mental Status: He is " alert.           Assessment/Plan   Problem List Items Addressed This Visit    None  Visit Diagnoses         Abdominal pain, RLQ    -  Primary

## 2025-07-08 NOTE — PATIENT INSTRUCTIONS
Right lower abdominal pain, over the past couple weeks, which is resolving.  No evidence of hip problem or groin tenderness on exam.  For now, will monitor.  If worsens, or additional symptoms occur, reach out and will evaluate further.    Right upper back pain, mostly resolved.  Suspect muscle strain.  Monitor for recurrence.

## 2025-07-09 ENCOUNTER — LAB (OUTPATIENT)
Dept: LAB | Facility: HOSPITAL | Age: 80
End: 2025-07-09
Payer: MEDICARE

## 2025-07-09 ENCOUNTER — OFFICE VISIT (OUTPATIENT)
Dept: HEMATOLOGY/ONCOLOGY | Facility: HOSPITAL | Age: 80
End: 2025-07-09
Payer: MEDICARE

## 2025-07-09 VITALS
WEIGHT: 174.8 LBS | DIASTOLIC BLOOD PRESSURE: 68 MMHG | HEART RATE: 63 BPM | BODY MASS INDEX: 23.17 KG/M2 | TEMPERATURE: 98.4 F | HEIGHT: 73 IN | OXYGEN SATURATION: 98 % | RESPIRATION RATE: 17 BRPM | SYSTOLIC BLOOD PRESSURE: 149 MMHG

## 2025-07-09 DIAGNOSIS — C95.91 LEUKEMIA IN REMISSION, UNSPECIFIED LEUKEMIA TYPE (MULTI): ICD-10-CM

## 2025-07-09 LAB
ALBUMIN SERPL BCP-MCNC: 4.2 G/DL (ref 3.4–5)
ALP SERPL-CCNC: 56 U/L (ref 33–136)
ALT SERPL W P-5'-P-CCNC: 15 U/L (ref 10–52)
ANION GAP SERPL CALC-SCNC: 10 MMOL/L (ref 10–20)
AST SERPL W P-5'-P-CCNC: 18 U/L (ref 9–39)
BASOPHILS # BLD AUTO: 0.03 X10*3/UL (ref 0–0.1)
BASOPHILS NFR BLD AUTO: 0.5 %
BILIRUB DIRECT SERPL-MCNC: 0.1 MG/DL (ref 0–0.3)
BILIRUB SERPL-MCNC: 0.8 MG/DL (ref 0–1.2)
BUN SERPL-MCNC: 19 MG/DL (ref 6–23)
CALCIUM SERPL-MCNC: 9.4 MG/DL (ref 8.6–10.3)
CHLORIDE SERPL-SCNC: 105 MMOL/L (ref 98–107)
CO2 SERPL-SCNC: 29 MMOL/L (ref 21–32)
CREAT SERPL-MCNC: 0.93 MG/DL (ref 0.5–1.3)
EGFRCR SERPLBLD CKD-EPI 2021: 84 ML/MIN/1.73M*2
EOSINOPHIL # BLD AUTO: 0.14 X10*3/UL (ref 0–0.4)
EOSINOPHIL NFR BLD AUTO: 2.1 %
ERYTHROCYTE [DISTWIDTH] IN BLOOD BY AUTOMATED COUNT: 11.4 % (ref 11.5–14.5)
GLUCOSE SERPL-MCNC: 201 MG/DL (ref 74–99)
HCT VFR BLD AUTO: 40.5 % (ref 41–52)
HGB BLD-MCNC: 13.9 G/DL (ref 13.5–17.5)
IGA SERPL-MCNC: 223 MG/DL (ref 70–400)
IGG SERPL-MCNC: 1280 MG/DL (ref 700–1600)
IGM SERPL-MCNC: 19 MG/DL (ref 40–230)
IMM GRANULOCYTES # BLD AUTO: 0.01 X10*3/UL (ref 0–0.5)
IMM GRANULOCYTES NFR BLD AUTO: 0.2 % (ref 0–0.9)
LDH SERPL L TO P-CCNC: 156 U/L (ref 84–246)
LYMPHOCYTES # BLD AUTO: 1.74 X10*3/UL (ref 0.8–3)
LYMPHOCYTES NFR BLD AUTO: 26.5 %
MCH RBC QN AUTO: 32.9 PG (ref 26–34)
MCHC RBC AUTO-ENTMCNC: 34.3 G/DL (ref 32–36)
MCV RBC AUTO: 96 FL (ref 80–100)
MONOCYTES # BLD AUTO: 0.46 X10*3/UL (ref 0.05–0.8)
MONOCYTES NFR BLD AUTO: 7 %
NEUTROPHILS # BLD AUTO: 4.18 X10*3/UL (ref 1.6–5.5)
NEUTROPHILS NFR BLD AUTO: 63.7 %
NRBC BLD-RTO: 0 /100 WBCS (ref 0–0)
PHOSPHATE SERPL-MCNC: 2.8 MG/DL (ref 2.5–4.9)
PLATELET # BLD AUTO: 164 X10*3/UL (ref 150–450)
POTASSIUM SERPL-SCNC: 3.8 MMOL/L (ref 3.5–5.3)
PROT SERPL-MCNC: 6.8 G/DL (ref 6.4–8.2)
PROT SERPL-MCNC: 7 G/DL (ref 6.4–8.2)
RBC # BLD AUTO: 4.22 X10*6/UL (ref 4.5–5.9)
SODIUM SERPL-SCNC: 140 MMOL/L (ref 136–145)
WBC # BLD AUTO: 6.6 X10*3/UL (ref 4.4–11.3)

## 2025-07-09 PROCEDURE — 82784 ASSAY IGA/IGD/IGG/IGM EACH: CPT

## 2025-07-09 PROCEDURE — 99213 OFFICE O/P EST LOW 20 MIN: CPT | Performed by: INTERNAL MEDICINE

## 2025-07-09 PROCEDURE — 1126F AMNT PAIN NOTED NONE PRSNT: CPT | Performed by: INTERNAL MEDICINE

## 2025-07-09 PROCEDURE — 83521 IG LIGHT CHAINS FREE EACH: CPT

## 2025-07-09 PROCEDURE — 3077F SYST BP >= 140 MM HG: CPT | Performed by: INTERNAL MEDICINE

## 2025-07-09 PROCEDURE — 36415 COLL VENOUS BLD VENIPUNCTURE: CPT

## 2025-07-09 PROCEDURE — 3078F DIAST BP <80 MM HG: CPT | Performed by: INTERNAL MEDICINE

## 2025-07-09 PROCEDURE — 84100 ASSAY OF PHOSPHORUS: CPT

## 2025-07-09 PROCEDURE — 80053 COMPREHEN METABOLIC PANEL: CPT

## 2025-07-09 PROCEDURE — 83615 LACTATE (LD) (LDH) ENZYME: CPT

## 2025-07-09 PROCEDURE — 84165 PROTEIN E-PHORESIS SERUM: CPT | Performed by: STUDENT IN AN ORGANIZED HEALTH CARE EDUCATION/TRAINING PROGRAM

## 2025-07-09 PROCEDURE — 85025 COMPLETE CBC W/AUTO DIFF WBC: CPT

## 2025-07-09 PROCEDURE — 84165 PROTEIN E-PHORESIS SERUM: CPT

## 2025-07-09 PROCEDURE — 84155 ASSAY OF PROTEIN SERUM: CPT | Mod: 59

## 2025-07-09 PROCEDURE — 82248 BILIRUBIN DIRECT: CPT

## 2025-07-09 ASSESSMENT — PAIN SCALES - GENERAL: PAINLEVEL_OUTOF10: 0-NO PAIN

## 2025-07-09 NOTE — PROGRESS NOTES
"Patient ID: Nico Spain is a 80 y.o. male.    Subjective    HPI  Hairy cell  Labs OK  Seen for resolved abd pain   To see cardiologist Evelyn jama  Spouse with end stage renal disease less travel    Note post prandial GLU    Objective    BSA: 2.03 meters squared  /68 (BP Location: Left arm, Patient Position: Sitting, BP Cuff Size: Adult long)   Pulse 63   Temp 36.9 °C (98.4 °F) (Skin)   Resp 17   Ht (S) 1.864 m (6' 1.39\")   Wt 79.3 kg (174 lb 12.8 oz)   SpO2 98%   BMI 22.82 kg/m²      Physical Exam  No nodes  No murmur NSR  No spleen  Performance Status:  Symptomatic; fully ambulatory      Assessment/Plan   Remission hairy cell    Cancer Staging   No matching staging information was found for the patient.      Oncology History    No history exists.        Diagnoses and all orders for this visit:  Leukemia in remission, unspecified leukemia type (Multi)  -     Clinic Appointment Request           Buddy Lazar MD            "

## 2025-07-10 ENCOUNTER — OFFICE VISIT (OUTPATIENT)
Dept: CARDIOLOGY | Facility: HOSPITAL | Age: 80
End: 2025-07-10
Payer: MEDICARE

## 2025-07-10 VITALS
DIASTOLIC BLOOD PRESSURE: 75 MMHG | BODY MASS INDEX: 23.19 KG/M2 | WEIGHT: 175 LBS | SYSTOLIC BLOOD PRESSURE: 153 MMHG | HEART RATE: 56 BPM | HEIGHT: 73 IN | OXYGEN SATURATION: 97 %

## 2025-07-10 DIAGNOSIS — E78.5 DYSLIPIDEMIA: ICD-10-CM

## 2025-07-10 DIAGNOSIS — I35.0 MILD AORTIC STENOSIS: ICD-10-CM

## 2025-07-10 DIAGNOSIS — Z95.5 S/P CORONARY ARTERY STENT PLACEMENT: ICD-10-CM

## 2025-07-10 DIAGNOSIS — F12.90 CANNABIS USE DISORDER: ICD-10-CM

## 2025-07-10 DIAGNOSIS — I25.2 H/O NON-ST ELEVATION MYOCARDIAL INFARCTION (NSTEMI): Primary | ICD-10-CM

## 2025-07-10 DIAGNOSIS — I10 PRIMARY HYPERTENSION: ICD-10-CM

## 2025-07-10 DIAGNOSIS — E78.41 ELEVATED LIPOPROTEIN A LEVEL: ICD-10-CM

## 2025-07-10 DIAGNOSIS — I25.10 CORONARY ARTERY DISEASE INVOLVING NATIVE CORONARY ARTERY OF NATIVE HEART WITHOUT ANGINA PECTORIS: ICD-10-CM

## 2025-07-10 LAB
ALBUMIN: 4.2 G/DL (ref 3.4–5)
ALPHA 1 GLOBULIN: 0.3 G/DL (ref 0.2–0.6)
ALPHA 2 GLOBULIN: 0.6 G/DL (ref 0.4–1.1)
ATRIAL RATE: 56 BPM
BETA GLOBULIN: 0.7 G/DL (ref 0.5–1.2)
GAMMA GLOBULIN: 1 G/DL (ref 0.5–1.4)
KAPPA LC SERPL-MCNC: 2.22 MG/DL (ref 0.33–1.94)
KAPPA LC/LAMBDA SER: 1.83 {RATIO} (ref 0.26–1.65)
LAMBDA LC SERPL-MCNC: 1.21 MG/DL (ref 0.57–2.63)
P AXIS: 20 DEGREES
P OFFSET: 199 MS
P ONSET: 142 MS
PATH REVIEW-SERUM PROTEIN ELECTROPHORESIS: NORMAL
PR INTERVAL: 164 MS
PROTEIN ELECTROPHORESIS COMMENT: NORMAL
Q ONSET: 224 MS
QRS COUNT: 9 BEATS
QRS DURATION: 86 MS
QT INTERVAL: 436 MS
QTC CALCULATION(BAZETT): 420 MS
QTC FREDERICIA: 426 MS
R AXIS: 29 DEGREES
T AXIS: 67 DEGREES
T OFFSET: 442 MS
VENTRICULAR RATE: 56 BPM

## 2025-07-10 PROCEDURE — 1160F RVW MEDS BY RX/DR IN RCRD: CPT | Performed by: INTERNAL MEDICINE

## 2025-07-10 PROCEDURE — 1036F TOBACCO NON-USER: CPT | Performed by: INTERNAL MEDICINE

## 2025-07-10 PROCEDURE — 1159F MED LIST DOCD IN RCRD: CPT | Performed by: INTERNAL MEDICINE

## 2025-07-10 PROCEDURE — G2211 COMPLEX E/M VISIT ADD ON: HCPCS | Performed by: INTERNAL MEDICINE

## 2025-07-10 PROCEDURE — 3077F SYST BP >= 140 MM HG: CPT | Performed by: INTERNAL MEDICINE

## 2025-07-10 PROCEDURE — 3078F DIAST BP <80 MM HG: CPT | Performed by: INTERNAL MEDICINE

## 2025-07-10 PROCEDURE — 93005 ELECTROCARDIOGRAM TRACING: CPT | Performed by: INTERNAL MEDICINE

## 2025-07-10 PROCEDURE — 99202 OFFICE O/P NEW SF 15 MIN: CPT

## 2025-07-10 PROCEDURE — 99215 OFFICE O/P EST HI 40 MIN: CPT | Performed by: INTERNAL MEDICINE

## 2025-07-10 RX ORDER — EVOLOCUMAB 140 MG/ML
140 INJECTION, SOLUTION SUBCUTANEOUS
Qty: 2 ML | Refills: 11 | Status: SHIPPED | OUTPATIENT
Start: 2025-07-10 | End: 2026-07-10

## 2025-07-10 NOTE — PATIENT INSTRUCTIONS
Prior NSTEMI with prior coronary artery stents placed in LAD and LCX- continue plavix and rosuvastatin 40 mg daily and metoprolol succinate 50 mg daily. Still not at target LDL < 55. Will try to add repatha. Sent rx to [facility] pharmacy to get prior authorization.   Dyslipidemia with target LDL < 55. Will get repeat fasting lipid panel as baseline ( previous ) and will repeat lipids in 3 months after starting repatha  Elevated lp(a)- should have children tested as this is a heritable risk factor for coronary artery disease.   Smoking cannabis- recommend reducing or stopping due to its inflammatory effects  Continue current level of exercise with daily walks.   Return to clinic in 4 months.

## 2025-07-10 NOTE — PROGRESS NOTES
"Primary Care Physician: Tyra Castle MD      Date of Visit: 07/10/2025 10:20 AM EDT  Location of visit: [facility]   Type of Visit: Established Patient     HPI / Summary:   Nico Spain is a very pleasant 80 y.o. male with HTN, dyslipidemia, hairy cell  leukemia in remission, mild aortic stenosis/insufficiency, with CAD s/p NSTEMI leading to PCI /stenting of LAD/LCx in 2019 who comes to establish cardiac care.     CAD irks factors: + HTN, + hyperlipidemia, no DM, no FHX of CAD, current marijuana smoker ( never smoked cigarettes)   Pt presented with NSTEMI 12/2019 . Cardiac cath showed LAD 95%, D3 75% ( ostial) ,  OM2 90%. Ultimately had stents placed in LAD and OM2. Completed DAPT and is now on plavix.   Echo 2/2022 with LVEF 65-70% no RWMA , normal RV size and function and mild aortic stenosis and mild AI.   Pt does not check BP at home. He walks for 15-20 minutes daily  and does occasional hikes. At this level of exercise he has no CP or SOB. Has no myalgias on rosuvastatin 40 mg daily.  Has no ACEi cough. Note in past pt not at LDL target on rosuvastatin 40 mg daily so tried to get praluent though denied by insurance. Also has h/o elevated lp(a) of 99. Pt denies palpitations  presyncope or syncope and denies PND orthopnea or LE swelling.        ROS: Full 10 pt review of symptoms of negative unless discussed above.     Problems:   Problem List[1]  Medical History:   Medical History[2]  Surgical Hx:   Surgical History[3]   Social Hx:   Tobacco Use: Low Risk  (7/10/2025)    Patient History     Smoking Tobacco Use: Never     Smokeless Tobacco Use: Never     Passive Exposure: Never     Alcohol Use: Not on file     Family Hx:   Family History[4]   Exam:   Vitals:   Vitals:    07/10/25 1029   BP: 153/75   Pulse: 56   SpO2: 97%   Weight: 79.4 kg (175 lb)   Height: 1.854 m (6' 1\")     Wt Readings from Last 5 Encounters:   07/10/25 79.4 kg (175 lb)   07/09/25 79.3 kg (174 lb 12.8 oz)   07/07/25 78.8 kg (173 lb " 12.8 oz)   01/15/25 79.9 kg (176 lb 2.4 oz)   12/23/24 78.9 kg (174 lb)      Constitutional:       Appearance: Healthy appearance. Not in distress.   Pulmonary:      Effort: Pulmonary effort is normal.      Breath sounds: Normal breath sounds.   Cardiovascular:      PMI at left midclavicular line. Normal rate. Regular rhythm. S1 with normal intensity. S2 with normal intensity.       Murmurs: There is a grade 1/6 systolic murmur at the URSB.   Pulses:     Intact distal pulses.   Edema:     Peripheral edema absent.   Neurological:      Mental Status: Alert and oriented to person, place, and time.       Labs:   Recent Labs     07/09/25  1358 01/15/25  1237 07/17/24  1436 02/07/24  1456 06/28/23  1511 02/23/23  1154 04/06/22  1412 05/26/21  1342   WBC 6.6 7.2 6.6 8.5 6.9 5.9 6.0 7.4   HGB 13.9 14.5 14.2 15.1 14.1 13.4* 14.0 13.9   HCT 40.5* 42.5 40.4* 43.8 40.0* 39.5* 40.8* 41.2    176 173 174 169 167 145* 184   MCV 96 98 94 94 95 98 99 99     Recent Labs     07/09/25  1358 01/15/25  1237 07/17/24  1436 02/07/24  1456 06/28/23  1511 02/23/23  1154 04/06/22  1412 05/26/21  1343    141 141 140 142 140 141 144   K 3.8 4.5 3.7 3.9 4.3 4.4 4.2 4.6    103 107 103 105 103 102 107   BUN 19 18 19 19 18 17 21 22   CREATININE 0.93 0.94 0.99 0.95 0.88 0.93 0.97 1.02      Recent Labs     05/26/21  1343 09/23/20  1334 11/06/19  1356 12/20/17  1429   TIBC 317 307 283 282   IRONSAT 40 45 36 27     Lab Results   Component Value Date    CHOL 189 12/23/2024    HDL 62.4 12/23/2024    LDLCALC 112 (H) 12/23/2024    TRIG 71 12/23/2024     Notable Studies: imaging personally reviewed and summarized by me below  EKG:  Encounter Date: 07/10/25   ECG 12 lead (Clinic Performed)   Result Value    Ventricular Rate 56    Atrial Rate 56    GA Interval 164    QRS Duration 86    QT Interval 436    QTC Calculation(Bazett) 420    P Axis 20    R Axis 29    T Axis 67    QRS Count 9    Q Onset 224    P Onset 142    P Offset 199    T Offset  442    QTC Fredericia 426    Narrative    Sinus bradycardia  Nonspecific T wave abnormality  Abnormal ECG  When compared with ECG of 23-FEB-2023 11:15,  No significant change was found       Echo 3/10/2022  PHYSICIAN INTERPRETATION:  Left Ventricle: The left ventricular systolic function is normal, with an estimated ejection fraction of 65-70%. There are no regional wall motion abnormalities. The left ventricular cavity size is normal. Spectral Doppler shows an impaired relaxation pattern of left ventricular diastolic filling.  Left Atrium: The left atrium is normal in size.  Right Ventricle: The right ventricle is normal in size. There is low normal right ventricular systolic function.  Right Atrium: The right atrium is normal in size.  Aortic Valve: The aortic valve is trileaflet. There is evidence of mild aortic valve stenosis.  The peak aortic velocity was obtained from the apical view. There is mild aortic valve regurgitation. The peak instantaneous gradient of the aortic valve is 17.2 mmHg. The mean gradient of the aortic valve is 9.0 mmHg.  Mitral Valve: The mitral valve is normal in structure. There is trace mitral valve regurgitation.  Tricuspid Valve: The tricuspid valve is structurally normal. There is mild tricuspid regurgitation.  Pulmonic Valve: The pulmonic valve is not well visualized. There is no indication of pulmonic valve regurgitation.  Pericardium: There is no pericardial effusion noted.  Aorta: The aortic root is normal.        CONCLUSIONS:   1. The left ventricular systolic function is normal with a 65-70% estimated ejection fraction.   2. Spectral Doppler shows an impaired relaxation pattern of left ventricular diastolic filling.   3. Mild aortic valve stenosis.   4. There is mild aortic valve regurgitation.    Left heart cath and intervention 12/26//2019  Coronary Lesion Summary:  Vessel         Stenosis   Vessel Segment Length (mm)  LAD          95% stenosis      mid           15  3rd  Diagonal 75% stenosis     ostial  OM 2         90% stenosis    proximal        20  RCA          30% stenosis     distal     CONCLUSIONS:   1. OCT guided PCI to mid LAD with 3.0x18 mm PAULINA and PCI to Cx/OM2 with 4.0x22 mm PAULINA.   2. Medical optimization and cardiac rehab.   3. Left Ventricular end-diastolic pressure = 10.      Current Outpatient Medications   Medication Instructions    clopidogrel (PLAVIX) 75 mg, oral, Daily (0630)    escitalopram (LEXAPRO) 20 mg, oral, Daily    lisinopril 5 mg tablet TAKE 1 TABLET BY MOUTH EVERY DAY    metoprolol succinate XL (Toprol-XL) 50 mg 24 hr tablet TAKE 1 TABLET DAILY.    nitroglycerin (NITROSTAT) 0.4 mg, sublingual, Every 5 min PRN    Repatha SureClick 140 mg, subcutaneous, Every 14 days    rosuvastatin (Crestor) 40 mg tablet TAKE 1 TABLET DAILY.    tadalafil 20 mg, oral, Daily PRN    traZODone (DESYREL) 50 mg, oral, Nightly     Impressions and Plan:    Pt is a 80 year old man with HTN dyslipidemia and CAD s/p NSTEMI in 2019 leading to stents in LAD and Lcx who is active without CP or SOB. His LDL target is < 55 given stents. To continue rosuvastatin 40 mg daily and will try to add repatha to achieve target. IN December . Will recheck fasting lipid panel to establish baseline Ldl on max dose of rosuvastatin.   Plan  HTN- continue metoprolol succinate 50 mg daily  and lisinopril 5 mg daily  Dyslipidemia- target LDL < 55. Continue rosuvastatin 40 mg daily and add repatha. Will recheck lipids in 3 months  CAD/s/p stents in LCX and LAD- plavix , metoprolol and lisinopril , statin and repatha  Mild aortic stenosis /AI- not assessed since 2022. Repeat echo for follow up.   Return to clinic in 4 months.      Patient Instructions:  If you have any questions or need cardiac medication refills, please call my office at [phone],      To reach my office please call [phone]  To schedule an appointment call [phone].           ____________________________________________________________  Evelyn Freeman MD  Division of Cardiovascular Medicine  [facility]          [1]   Patient Active Problem List  Diagnosis    Asymmetric SNHL (sensorineural hearing loss)    Primary hypertension    Coronary artery disease involving native coronary artery of native heart without angina pectoris    Depression    Dyslipidemia    H/O non-ST elevation myocardial infarction (NSTEMI)    Insomnia    Mild intermittent asthma without complication (HHS-HCC)    Viral URI with cough    Chronic bilateral low back pain without sciatica    Contusion of left knee    Contusion of right hip    Epiretinal membrane (ERM) of right eye    Inguinal hernia, left    Left ankle swelling    Myocardial infarction (Multi)    Primary open angle glaucoma of both eyes, mild stage    Pruritus    Pseudophakia of both eyes    Unstable angina pectoris (Multi)    Leukemia in remission, unspecified leukemia type (Multi)    Erectile dysfunction    Hearing loss of left ear    Halitosis    Sprain of knee    Knee pain    Cannabis use disorder   [2]   Past Medical History:  Diagnosis Date    Personal history of other medical treatment     H/O Doppler ultrasound    Personal history of other medical treatment     History of stress test    Personal history of other medical treatment     History of echocardiogram   [3]   Past Surgical History:  Procedure Laterality Date    OTHER SURGICAL HISTORY  01/26/2022    Cardiac catheterization with stent placement   [4]   Family History  Problem Relation Name Age of Onset    No Known Problems Mother      Parkinsonism Father

## 2025-07-14 ENCOUNTER — HOSPITAL ENCOUNTER (OUTPATIENT)
Dept: CARDIOLOGY | Facility: HOSPITAL | Age: 80
Discharge: HOME | End: 2025-07-14
Payer: MEDICARE

## 2025-07-14 DIAGNOSIS — I35.0 MILD AORTIC STENOSIS: ICD-10-CM

## 2025-07-14 DIAGNOSIS — I35.1 NONRHEUMATIC AORTIC (VALVE) INSUFFICIENCY: ICD-10-CM

## 2025-07-14 LAB
AORTIC VALVE MEAN GRADIENT: 9 MMHG
AORTIC VALVE PEAK VELOCITY: 2.06 M/S
AV PEAK GRADIENT: 17 MMHG
AVA (PEAK VEL): 1.54 CM2
AVA (VTI): 1.48 CM2
EJECTION FRACTION APICAL 4 CHAMBER: 40.3
EJECTION FRACTION: 53 %
LEFT ATRIUM VOLUME AREA LENGTH INDEX BSA: 36.2 ML/M2
LEFT VENTRICLE INTERNAL DIMENSION DIASTOLE: 4.75 CM (ref 3.5–6)
LEFT VENTRICULAR OUTFLOW TRACT DIAMETER: 2.11 CM
MITRAL VALVE E/A RATIO: 0.88
RIGHT VENTRICLE FREE WALL PEAK S': 10 CM/S
RIGHT VENTRICLE PEAK SYSTOLIC PRESSURE: 25 MMHG
TRICUSPID ANNULAR PLANE SYSTOLIC EXCURSION: 2.2 CM

## 2025-07-14 PROCEDURE — 93306 TTE W/DOPPLER COMPLETE: CPT

## 2025-07-14 PROCEDURE — 93306 TTE W/DOPPLER COMPLETE: CPT | Performed by: INTERNAL MEDICINE

## 2025-07-15 ENCOUNTER — APPOINTMENT (OUTPATIENT)
Dept: LAB | Facility: HOSPITAL | Age: 80
End: 2025-07-15
Payer: MEDICARE

## 2025-07-15 ENCOUNTER — LAB (OUTPATIENT)
Dept: LAB | Facility: HOSPITAL | Age: 80
End: 2025-07-15
Payer: MEDICARE

## 2025-07-15 DIAGNOSIS — I25.10 ATHEROSCLEROTIC HEART DISEASE OF NATIVE CORONARY ARTERY WITHOUT ANGINA PECTORIS: ICD-10-CM

## 2025-07-15 DIAGNOSIS — C95.91: Primary | ICD-10-CM

## 2025-07-15 LAB
CHOLEST SERPL-MCNC: 160 MG/DL (ref 0–199)
CHOLESTEROL/HDL RATIO: 2.9
HDLC SERPL-MCNC: 55.3 MG/DL
LDLC SERPL CALC-MCNC: 92 MG/DL
NON HDL CHOLESTEROL: 105 MG/DL (ref 0–149)
TRIGL SERPL-MCNC: 62 MG/DL (ref 0–149)
VLDL: 12 MG/DL (ref 0–40)

## 2025-07-15 PROCEDURE — 80061 LIPID PANEL: CPT

## 2025-07-16 LAB
CHOLEST SERPL-MCNC: 157 MG/DL
CHOLEST/HDLC SERPL: 2.7 (CALC)
HDLC SERPL-MCNC: 58 MG/DL
LDLC SERPL CALC-MCNC: 85 MG/DL (CALC)
NONHDLC SERPL-MCNC: 99 MG/DL (CALC)
TRIGL SERPL-MCNC: 65 MG/DL

## 2025-07-21 LAB
ATRIAL RATE: 56 BPM
P AXIS: 20 DEGREES
P OFFSET: 199 MS
P ONSET: 142 MS
PR INTERVAL: 164 MS
Q ONSET: 224 MS
QRS COUNT: 9 BEATS
QRS DURATION: 86 MS
QT INTERVAL: 436 MS
QTC CALCULATION(BAZETT): 420 MS
QTC FREDERICIA: 426 MS
R AXIS: 29 DEGREES
T AXIS: 67 DEGREES
T OFFSET: 442 MS
VENTRICULAR RATE: 56 BPM

## 2025-08-02 DIAGNOSIS — I25.10 CORONARY ARTERY DISEASE INVOLVING NATIVE CORONARY ARTERY OF NATIVE HEART WITHOUT ANGINA PECTORIS: ICD-10-CM

## 2025-08-04 RX ORDER — NITROGLYCERIN 0.4 MG/1
0.4 TABLET SUBLINGUAL EVERY 5 MIN PRN
Qty: 25 TABLET | Refills: 2 | Status: SHIPPED | OUTPATIENT
Start: 2025-08-04

## 2025-08-14 ENCOUNTER — APPOINTMENT (OUTPATIENT)
Dept: AUDIOLOGY | Facility: CLINIC | Age: 80
End: 2025-08-14
Payer: MEDICARE

## 2025-08-14 DIAGNOSIS — H90.A21 SENSORINEURAL HEARING LOSS (SNHL) OF RIGHT EAR WITH RESTRICTED HEARING OF LEFT EAR: ICD-10-CM

## 2025-08-14 DIAGNOSIS — H90.A32 MIXED CONDUCTIVE AND SENSORINEURAL HEARING LOSS OF LEFT EAR WITH RESTRICTED HEARING OF RIGHT EAR: Primary | ICD-10-CM

## 2025-08-14 PROCEDURE — 92567 TYMPANOMETRY: CPT | Performed by: AUDIOLOGIST

## 2025-08-14 PROCEDURE — 92557 COMPREHENSIVE HEARING TEST: CPT | Performed by: AUDIOLOGIST

## 2025-08-20 DIAGNOSIS — I10 PRIMARY HYPERTENSION: ICD-10-CM

## 2025-08-20 DIAGNOSIS — I25.10 CORONARY ARTERY DISEASE INVOLVING NATIVE CORONARY ARTERY OF NATIVE HEART WITHOUT ANGINA PECTORIS: ICD-10-CM

## 2025-08-20 DIAGNOSIS — E78.5 DYSLIPIDEMIA: ICD-10-CM

## 2025-08-20 RX ORDER — ROSUVASTATIN CALCIUM 40 MG/1
40 TABLET, COATED ORAL DAILY
Qty: 90 TABLET | Refills: 0 | Status: SHIPPED | OUTPATIENT
Start: 2025-08-20 | End: 2025-11-18

## 2025-08-20 RX ORDER — LISINOPRIL 5 MG/1
5 TABLET ORAL DAILY
Qty: 90 TABLET | Refills: 0 | Status: SHIPPED | OUTPATIENT
Start: 2025-08-20 | End: 2025-11-18

## 2025-09-03 DIAGNOSIS — I25.10 CORONARY ARTERY DISEASE INVOLVING NATIVE CORONARY ARTERY OF NATIVE HEART WITHOUT ANGINA PECTORIS: ICD-10-CM

## 2025-09-03 DIAGNOSIS — I10 PRIMARY HYPERTENSION: ICD-10-CM

## 2025-09-03 RX ORDER — CLOPIDOGREL BISULFATE 75 MG/1
75 TABLET ORAL
Qty: 30 TABLET | Refills: 0 | Status: SHIPPED | OUTPATIENT
Start: 2025-09-03 | End: 2025-10-03

## 2025-09-03 RX ORDER — METOPROLOL SUCCINATE 50 MG/1
50 TABLET, EXTENDED RELEASE ORAL DAILY
Qty: 30 TABLET | Refills: 0 | Status: SHIPPED | OUTPATIENT
Start: 2025-09-03 | End: 2025-10-03

## 2025-09-08 ENCOUNTER — APPOINTMENT (OUTPATIENT)
Dept: AUDIOLOGY | Facility: CLINIC | Age: 80
End: 2025-09-08
Payer: MEDICARE